# Patient Record
Sex: FEMALE | Race: WHITE | NOT HISPANIC OR LATINO | Employment: OTHER | URBAN - METROPOLITAN AREA
[De-identification: names, ages, dates, MRNs, and addresses within clinical notes are randomized per-mention and may not be internally consistent; named-entity substitution may affect disease eponyms.]

---

## 2017-01-17 ENCOUNTER — OFFICE VISIT (OUTPATIENT)
Dept: CARDIOLOGY | Facility: MEDICAL CENTER | Age: 72
End: 2017-01-17
Payer: MEDICARE

## 2017-01-17 VITALS
HEART RATE: 56 BPM | HEIGHT: 62 IN | WEIGHT: 150 LBS | BODY MASS INDEX: 27.6 KG/M2 | DIASTOLIC BLOOD PRESSURE: 74 MMHG | SYSTOLIC BLOOD PRESSURE: 130 MMHG | OXYGEN SATURATION: 98 %

## 2017-01-17 DIAGNOSIS — I49.1 PAC (PREMATURE ATRIAL CONTRACTION): ICD-10-CM

## 2017-01-17 DIAGNOSIS — I49.3 PVC (PREMATURE VENTRICULAR CONTRACTION): ICD-10-CM

## 2017-01-17 DIAGNOSIS — I47.10 SVT (SUPRAVENTRICULAR TACHYCARDIA): ICD-10-CM

## 2017-01-17 PROCEDURE — 99214 OFFICE O/P EST MOD 30 MIN: CPT | Performed by: INTERNAL MEDICINE

## 2017-01-17 ASSESSMENT — ENCOUNTER SYMPTOMS
LOSS OF CONSCIOUSNESS: 0
SHORTNESS OF BREATH: 0
FALLS: 0
COUGH: 0
PND: 0
ORTHOPNEA: 0
DOUBLE VISION: 0
ABDOMINAL PAIN: 0
BRUISES/BLEEDS EASILY: 0
HALLUCINATIONS: 0
SENSORY CHANGE: 0
NAUSEA: 0
EYE PAIN: 0
DIZZINESS: 0
EYE DISCHARGE: 0
CLAUDICATION: 0
PALPITATIONS: 0
BLURRED VISION: 0
WEIGHT LOSS: 0
FEVER: 0
CHILLS: 0
MYALGIAS: 0
VOMITING: 0
DEPRESSION: 0
HEADACHES: 0
SPEECH CHANGE: 0
BLOOD IN STOOL: 0

## 2017-01-17 NOTE — Clinical Note
[facility]   [address], [address]  [place] [address]  Phone: [phone]  Fax: [phone]              Zo Sanders  1945    Encounter Date: 1/17/2017    Tonia Nunez M.D.          PROGRESS NOTE:  Subjective:   Zo Sanders is a 71 y.o. female who presents today for cardiac care and follow-up of her recent event monitor results. The monitor shows evidence of occasional PAC and PVCs. No malignant arrhythmias. Patient has been feeling better these days. However, she does report having exertional dyspnea especially up inclines.    Past Medical History   Diagnosis Date   • Hypertension    • Arrhythmia      History reviewed. No pertinent past surgical history.  Family History   Problem Relation Age of Onset   • Heart Disease Mother      History   Smoking status   • Former Smoker   • Quit date: 07/22/1971   Smokeless tobacco   • Not on file     No Known Allergies  Outpatient Encounter Prescriptions as of 1/17/2017   Medication Sig Dispense Refill   • diltiazem CD (CARDIZEM CD) 120 MG CAPSULE SR 24 HR Take 1 Cap by mouth every day. 30 Cap 3   • lisinopril (PRINIVIL) 20 MG Tab        No facility-administered encounter medications on file as of 1/17/2017.     Review of Systems   Constitutional: Negative for fever, chills, weight loss and malaise/fatigue.   HENT: Negative for ear discharge, ear pain, hearing loss and nosebleeds.    Eyes: Negative for blurred vision, double vision, pain and discharge.   Respiratory: Negative for cough and shortness of breath.    Cardiovascular: Negative for chest pain, palpitations, orthopnea, claudication, leg swelling and PND.   Gastrointestinal: Negative for nausea, vomiting, abdominal pain, blood in stool and melena.   Genitourinary: Negative for dysuria and hematuria.   Musculoskeletal: Negative for myalgias, joint pain and falls.   Skin: Negative for itching and rash.   Neurological: Negative for dizziness, sensory  "change, speech change, loss of consciousness and headaches.   Endo/Heme/Allergies: Negative for environmental allergies. Does not bruise/bleed easily.   Psychiatric/Behavioral: Negative for depression, suicidal ideas and hallucinations.        Objective:   /74 mmHg  Pulse 56  Ht 1.575 m (5' 2\")  Wt 68.04 kg (150 lb)  BMI 27.43 kg/m2  SpO2 98%    Physical Exam   Constitutional: She is oriented to person, place, and time. No distress.   HENT:   Head: Normocephalic and atraumatic.   Eyes: EOM are normal.   Neck: No JVD present.   Cardiovascular: Normal rate, regular rhythm, normal heart sounds and intact distal pulses.  Exam reveals no gallop and no friction rub.    No murmur heard.  Pulmonary/Chest: No respiratory distress. She has no wheezes. She has no rales. She exhibits no tenderness.   Abdominal: She exhibits no distension. There is no tenderness. There is no rebound and no guarding.   Musculoskeletal: She exhibits no edema or tenderness.   Lymphadenopathy:     She has no cervical adenopathy.   Neurological: She is alert and oriented to person, place, and time.   Skin: Skin is dry.   Psychiatric: She has a normal mood and affect.   Nursing note and vitals reviewed.      Assessment:     1. SVT (supraventricular tachycardia) (CMS-HCC)     2. PAC (premature atrial contraction)     3. PVC (premature ventricular contraction)     4. Dyspnea on exertion         Medical Decision Making:  Today's Assessment / Status / Plan:     At this time patient is clinically stable in terms of her cardiac standpoint.  Cont current medications at current dose.     I will see patient back in clinic with lab tests and studies results in 6 months.    I thank you Dr. Lan for referring patient to our Cardiology Clinic today.        Jani Lan M.D.  [address] [facility]  [address]  [facility] [address]  VIA Facsimile: [phone]                   "

## 2017-01-17 NOTE — PROGRESS NOTES
Subjective:   Zo Sanders is a 71 y.o. female who presents today for cardiac care and follow-up of her recent event monitor results. The monitor shows evidence of occasional PAC and PVCs. No malignant arrhythmias. Patient has been feeling better these days. However, she does report having exertional dyspnea especially up inclines.    Past Medical History   Diagnosis Date   • Hypertension    • Arrhythmia      History reviewed. No pertinent past surgical history.  Family History   Problem Relation Age of Onset   • Heart Disease Mother      History   Smoking status   • Former Smoker   • Quit date: 07/22/1971   Smokeless tobacco   • Not on file     No Known Allergies  Outpatient Encounter Prescriptions as of 1/17/2017   Medication Sig Dispense Refill   • diltiazem CD (CARDIZEM CD) 120 MG CAPSULE SR 24 HR Take 1 Cap by mouth every day. 30 Cap 3   • lisinopril (PRINIVIL) 20 MG Tab        No facility-administered encounter medications on file as of 1/17/2017.     Review of Systems   Constitutional: Negative for fever, chills, weight loss and malaise/fatigue.   HENT: Negative for ear discharge, ear pain, hearing loss and nosebleeds.    Eyes: Negative for blurred vision, double vision, pain and discharge.   Respiratory: Negative for cough and shortness of breath.    Cardiovascular: Negative for chest pain, palpitations, orthopnea, claudication, leg swelling and PND.   Gastrointestinal: Negative for nausea, vomiting, abdominal pain, blood in stool and melena.   Genitourinary: Negative for dysuria and hematuria.   Musculoskeletal: Negative for myalgias, joint pain and falls.   Skin: Negative for itching and rash.   Neurological: Negative for dizziness, sensory change, speech change, loss of consciousness and headaches.   Endo/Heme/Allergies: Negative for environmental allergies. Does not bruise/bleed easily.   Psychiatric/Behavioral: Negative for depression, suicidal ideas and hallucinations.        Objective:   /74  "mmHg  Pulse 56  Ht 1.575 m (5' 2\")  Wt 68.04 kg (150 lb)  BMI 27.43 kg/m2  SpO2 98%    Physical Exam   Constitutional: She is oriented to person, place, and time. No distress.   HENT:   Head: Normocephalic and atraumatic.   Eyes: EOM are normal.   Neck: No JVD present.   Cardiovascular: Normal rate, regular rhythm, normal heart sounds and intact distal pulses.  Exam reveals no gallop and no friction rub.    No murmur heard.  Pulmonary/Chest: No respiratory distress. She has no wheezes. She has no rales. She exhibits no tenderness.   Abdominal: She exhibits no distension. There is no tenderness. There is no rebound and no guarding.   Musculoskeletal: She exhibits no edema or tenderness.   Lymphadenopathy:     She has no cervical adenopathy.   Neurological: She is alert and oriented to person, place, and time.   Skin: Skin is dry.   Psychiatric: She has a normal mood and affect.   Nursing note and vitals reviewed.      Assessment:     1. SVT (supraventricular tachycardia) (CMS-HCC)     2. PAC (premature atrial contraction)     3. PVC (premature ventricular contraction)     4. Dyspnea on exertion         Medical Decision Making:  Today's Assessment / Status / Plan:     At this time patient is clinically stable in terms of her cardiac standpoint.  Cont current medications at current dose.     I will see patient back in clinic with lab tests and studies results in 6 months.    I thank you Dr. Lan for referring patient to our Cardiology Clinic today.    "

## 2017-01-17 NOTE — MR AVS SNAPSHOT
"        Zo Cisneros Jordanpio   2017 9:45 AM   Office Visit   MRN: [id number]    Department:  [facility]   Dept Phone:  [phone]    Description:  Female : 1945   Provider:  Tonia Nunez M.D.           Reason for Visit     Follow-Up           Allergies as of 2017     No Known Allergies      You were diagnosed with     SVT (supraventricular tachycardia) (CMS-[facility])   [652883]       PAC (premature atrial contraction)   [799234]       PVC (premature ventricular contraction)   [981383]         Vital Signs     Blood Pressure Pulse Height Weight Body Mass Index Oxygen Saturation    130/74 mmHg 56 1.575 m (5' 2\") 68.04 kg (150 lb) 27.43 kg/m2 98%    Smoking Status                   Former Smoker           Basic Information     Date Of Birth Sex Race Ethnicity Preferred Language    1945 Female White Non- English      Health Maintenance        Date Due Completion Dates    IMM DTaP/Tdap/Td Vaccine (1 - Tdap) 1963 ---    PAP SMEAR 1965 ---    MAMMOGRAM 1984 ---    COLONOSCOPY 1994 ---    IMM ZOSTER VACCINE 2004 ---    BONE DENSITY 2009 ---    IMM PNEUMOCOCCAL 65+ (ADULT) LOW/MEDIUM RISK SERIES (1 of 2 - PCV13) 2009 ---    IMM INFLUENZA (1) 2016 ---            Current Immunizations     No immunizations on file.      Below and/or attached are the medications your provider expects you to take. Review all of your home medications and newly ordered medications with your provider and/or pharmacist. Follow medication instructions as directed by your provider and/or pharmacist. Please keep your medication list with you and share with your provider. Update the information when medications are discontinued, doses are changed, or new medications (including over-the-counter products) are added; and carry medication information at all times in the event of emergency situations     Allergies:  No Known Allergies          Medications  Valid as of: 2017 - 10:06 AM   " Generic Name Brand Name Tablet Size Instructions for use    DiltiaZEM HCl Coated Beads (CAPSULE SR 24 HR) CARDIZEM  MG Take 1 Cap by mouth every day.        Lisinopril (Tab) PRINIVIL 20 MG         .                 Medicines prescribed today were sent to:     [facility] [address] [place] [address], [place] - [address] [facility] [place] [address]    Phone: [phone] Fax: [phone]    Open 24 Hours?: No      Medication refill instructions:       If your prescription bottle indicates you have medication refills left, it is not necessary to call your provider’s office. Please contact your pharmacy and they will refill your medication.    If your prescription bottle indicates you do not have any refills left, you may request refills at any time through one of the following ways: The online Vigilos system (except Urgent Care), by calling your provider’s office, or by asking your pharmacy to contact your provider’s office with a refill request. Medication refills are processed only during regular business hours and may not be available until the next business day. Your provider may request additional information or to have a follow-up visit with you prior to refilling your medication.   *Please Note: Medication refills are assigned a new Rx number when refilled electronically. Your pharmacy may indicate that no refills were authorized even though a new prescription for the same medication is available at the pharmacy. Please request the medicine by name with the pharmacy before contacting your provider for a refill.        Your To Do List     Future Labs/Procedures Complete By Expires    COMP METABOLIC PANEL  As directed 1/18/2018         Vigilos Access Code: Activation code not generated  Current Vigilos Status: Active

## 2017-02-23 DIAGNOSIS — I49.1 PREMATURE ATRIAL COMPLEX: ICD-10-CM

## 2017-02-24 RX ORDER — DILTIAZEM HYDROCHLORIDE 120 MG/1
CAPSULE, COATED, EXTENDED RELEASE ORAL
Qty: 30 CAP | Refills: 6 | Status: SHIPPED | OUTPATIENT
Start: 2017-02-24 | End: 2017-07-20 | Stop reason: SDUPTHER

## 2017-07-13 LAB
CHOLEST SERPL-MCNC: 172 MG/DL (ref 100–199)
COMMENT 011824: NORMAL
HDLC SERPL-MCNC: 69 MG/DL
LDLC SERPL CALC-MCNC: 83 MG/DL (ref 0–99)
TRIGL SERPL-MCNC: 100 MG/DL (ref 0–149)
VLDLC SERPL CALC-MCNC: 20 MG/DL (ref 5–40)

## 2017-07-20 ENCOUNTER — OFFICE VISIT (OUTPATIENT)
Dept: CARDIOLOGY | Facility: MEDICAL CENTER | Age: 72
End: 2017-07-20
Payer: MEDICARE

## 2017-07-20 VITALS
WEIGHT: 155 LBS | BODY MASS INDEX: 28.52 KG/M2 | HEIGHT: 62 IN | DIASTOLIC BLOOD PRESSURE: 76 MMHG | OXYGEN SATURATION: 95 % | SYSTOLIC BLOOD PRESSURE: 114 MMHG | HEART RATE: 68 BPM

## 2017-07-20 DIAGNOSIS — I49.1 PREMATURE ATRIAL COMPLEX: ICD-10-CM

## 2017-07-20 DIAGNOSIS — I49.3 PREMATURE VENTRICULAR COMPLEX: ICD-10-CM

## 2017-07-20 DIAGNOSIS — I10 ESSENTIAL HYPERTENSION: ICD-10-CM

## 2017-07-20 PROCEDURE — 99214 OFFICE O/P EST MOD 30 MIN: CPT | Performed by: INTERNAL MEDICINE

## 2017-07-20 RX ORDER — DILTIAZEM HYDROCHLORIDE 120 MG/1
120 CAPSULE, COATED, EXTENDED RELEASE ORAL DAILY
Qty: 90 CAP | Refills: 3 | Status: SHIPPED | OUTPATIENT
Start: 2017-07-20 | End: 2018-02-05 | Stop reason: SDUPTHER

## 2017-07-20 RX ORDER — LISINOPRIL 20 MG/1
20 TABLET ORAL DAILY
Qty: 90 TAB | Refills: 3 | Status: SHIPPED | OUTPATIENT
Start: 2017-07-20 | End: 2018-02-05 | Stop reason: SDUPTHER

## 2017-07-20 ASSESSMENT — ENCOUNTER SYMPTOMS
DOUBLE VISION: 0
LOSS OF CONSCIOUSNESS: 0
NAUSEA: 0
MYALGIAS: 0
BLURRED VISION: 0
CLAUDICATION: 0
COUGH: 0
FALLS: 0
PALPITATIONS: 0
EYE PAIN: 0
HEADACHES: 0
HALLUCINATIONS: 0
EYE DISCHARGE: 0
SHORTNESS OF BREATH: 0
BRUISES/BLEEDS EASILY: 0
FEVER: 0
SPEECH CHANGE: 0
BLOOD IN STOOL: 0
DIZZINESS: 0
ORTHOPNEA: 0
VOMITING: 0
DEPRESSION: 0
ABDOMINAL PAIN: 0
SENSORY CHANGE: 0
PND: 0
WEIGHT LOSS: 0
CHILLS: 0

## 2017-07-20 NOTE — MR AVS SNAPSHOT
"Zo Cisneros Jordanpivicki   2017 10:45 AM   Office Visit   MRN: [id number]    Department:  [facility]   Dept Phone:  [phone]    Description:  Female : 1945   Provider:  Tonia Nunez M.D.           Reason for Visit     Follow-Up           Allergies as of 2017     No Known Allergies      You were diagnosed with     Premature atrial complex   [749887]       Premature ventricular complex   [374028]       Essential hypertension   [1610001]         Vital Signs     Blood Pressure Pulse Height Weight Body Mass Index Oxygen Saturation    114/76 mmHg 68 1.575 m (5' 2\") 70.308 kg (155 lb) 28.34 kg/m2 95%    Smoking Status                   Former Smoker           Basic Information     Date Of Birth Sex Race Ethnicity Preferred Language    1945 Female White Non- English      Health Maintenance        Date Due Completion Dates    IMM DTaP/Tdap/Td Vaccine (1 - Tdap) 1963 ---    PAP SMEAR 1965 ---    MAMMOGRAM 1984 ---    COLONOSCOPY 1994 ---    IMM ZOSTER VACCINE 2004 ---    BONE DENSITY 2009 ---    IMM PNEUMOCOCCAL 65+ (ADULT) LOW/MEDIUM RISK SERIES (1 of 2 - PCV13) 2009 ---    IMM INFLUENZA (1) 2017 ---            Current Immunizations     No immunizations on file.      Below and/or attached are the medications your provider expects you to take. Review all of your home medications and newly ordered medications with your provider and/or pharmacist. Follow medication instructions as directed by your provider and/or pharmacist. Please keep your medication list with you and share with your provider. Update the information when medications are discontinued, doses are changed, or new medications (including over-the-counter products) are added; and carry medication information at all times in the event of emergency situations     Allergies:  No Known Allergies          Medications  Valid as of: 2017 - 11:08 AM    Generic Name Brand Name Tablet Size " Instructions for use    DilTIAZem HCl Coated Beads (CAPSULE SR 24 HR) CARDIZEM  MG Take 1 Cap by mouth every day.        Lisinopril (Tab) PRINIVIL 20 MG Take 1 Tab by mouth every day.        .                 Medicines prescribed today were sent to:     [facility] [address] [place] [address], [place] - [address] [id number] [facility] [place] [address]    Phone: [phone] Fax: [phone]    Open 24 Hours?: No      Medication refill instructions:       If your prescription bottle indicates you have medication refills left, it is not necessary to call your provider’s office. Please contact your pharmacy and they will refill your medication.    If your prescription bottle indicates you do not have any refills left, you may request refills at any time through one of the following ways: The online Moka system (except Urgent Care), by calling your provider’s office, or by asking your pharmacy to contact your provider’s office with a refill request. Medication refills are processed only during regular business hours and may not be available until the next business day. Your provider may request additional information or to have a follow-up visit with you prior to refilling your medication.   *Please Note: Medication refills are assigned a new Rx number when refilled electronically. Your pharmacy may indicate that no refills were authorized even though a new prescription for the same medication is available at the pharmacy. Please request the medicine by name with the pharmacy before contacting your provider for a refill.        Your To Do List     Future Labs/Procedures Complete By Expires    COMP METABOLIC PANEL  As directed 7/21/2018         Moka Access Code: Activation code not generated  Current Moka Status: Active

## 2017-07-20 NOTE — Clinical Note
[facility]   [address], [address]  [place] [address]  Phone: [phone]  Fax: [phone]              Zo Sanders  1945    Encounter Date: 7/20/2017    Tonia Nunez M.D.          PROGRESS NOTE:  Subjective:   Zo Sanders is a 71 y.o. female who presents today for cardiac care and follow-up of her recent event monitor results. The monitor shows evidence of occasional PAC and PVCs. No malignant arrhythmias. Patient has been feeling better these days. However, she does report having exertional dyspnea especially up inclines.    LDL is within goal.   Renal function and Liver function are adequate.    Past Medical History   Diagnosis Date   • Hypertension    • Arrhythmia      History reviewed. No pertinent past surgical history.  Family History   Problem Relation Age of Onset   • Heart Disease Mother      History   Smoking status   • Former Smoker   • Quit date: 07/22/1971   Smokeless tobacco   • Not on file     No Known Allergies  Outpatient Encounter Prescriptions as of 7/20/2017   Medication Sig Dispense Refill   • diltiazem CD (CARDIZEM CD) 120 MG CAPSULE SR 24 HR TAKE ONE CAPSULE BY MOUTH ONCE DAILY 30 Cap 6   • lisinopril (PRINIVIL) 20 MG Tab        No facility-administered encounter medications on file as of 7/20/2017.     Review of Systems   Constitutional: Negative for fever, chills, weight loss and malaise/fatigue.   HENT: Negative for ear discharge, ear pain, hearing loss and nosebleeds.    Eyes: Negative for blurred vision, double vision, pain and discharge.   Respiratory: Negative for cough and shortness of breath.    Cardiovascular: Negative for chest pain, palpitations, orthopnea, claudication, leg swelling and PND.   Gastrointestinal: Negative for nausea, vomiting, abdominal pain, blood in stool and melena.   Genitourinary: Negative for dysuria and hematuria.   Musculoskeletal: Negative for myalgias, joint pain and falls.   Skin:  "Negative for itching and rash.   Neurological: Negative for dizziness, sensory change, speech change, loss of consciousness and headaches.   Endo/Heme/Allergies: Negative for environmental allergies. Does not bruise/bleed easily.   Psychiatric/Behavioral: Negative for depression, suicidal ideas and hallucinations.        Objective:   /76 mmHg  Pulse 68  Ht 1.575 m (5' 2\")  Wt 70.308 kg (155 lb)  BMI 28.34 kg/m2  SpO2 95%    Physical Exam   Constitutional: She is oriented to person, place, and time. She appears well-developed and well-nourished.   HENT:   Head: Normocephalic and atraumatic.   Eyes: EOM are normal.   Neck: No JVD present.   Cardiovascular: Normal rate, regular rhythm, normal heart sounds and intact distal pulses.  Exam reveals no gallop and no friction rub.    No murmur heard.  Pulmonary/Chest: No respiratory distress. She has no wheezes. She has no rales. She exhibits no tenderness.   Abdominal: She exhibits no distension. There is no tenderness. There is no rebound and no guarding.   Musculoskeletal: She exhibits no edema or tenderness.   Lymphadenopathy:     She has no cervical adenopathy.   Neurological: She is alert and oriented to person, place, and time.   Skin: Skin is dry.   Psychiatric: She has a normal mood and affect.   Nursing note and vitals reviewed.      Assessment:     1. Premature atrial complex  COMP METABOLIC PANEL    LIPID PANEL   2. Premature ventricular complex  COMP METABOLIC PANEL    LIPID PANEL   3. Essential hypertension  COMP METABOLIC PANEL    LIPID PANEL       Medical Decision Making:  Today's Assessment / Status / Plan:     Blood pressure is well controlled.  At this time patient is clinically stable in terms of her cardiac standpoint.    Cont current medications at current dose.     I will see patient back in clinic with lab tests and studies results in 12 months.    I thank you Dr. Lan for referring patient to our Cardiology Clinic today.        Jani" HEMA Lan M.D.  [address] [facility] [address]  [facility] [address]  VIA Facsimile: [phone]

## 2017-07-20 NOTE — PROGRESS NOTES
Subjective:   Zo Sanders is a 71 y.o. female who presents today for cardiac care and follow-up of her recent event monitor results. The monitor shows evidence of occasional PAC and PVCs. No malignant arrhythmias. Patient has been feeling better these days. However, she does report having exertional dyspnea especially up inclines.    LDL is within goal.   Renal function and Liver function are adequate.    Past Medical History   Diagnosis Date   • Hypertension    • Arrhythmia      History reviewed. No pertinent past surgical history.  Family History   Problem Relation Age of Onset   • Heart Disease Mother      History   Smoking status   • Former Smoker   • Quit date: 07/22/1971   Smokeless tobacco   • Not on file     No Known Allergies  Outpatient Encounter Prescriptions as of 7/20/2017   Medication Sig Dispense Refill   • diltiazem CD (CARDIZEM CD) 120 MG CAPSULE SR 24 HR TAKE ONE CAPSULE BY MOUTH ONCE DAILY 30 Cap 6   • lisinopril (PRINIVIL) 20 MG Tab        No facility-administered encounter medications on file as of 7/20/2017.     Review of Systems   Constitutional: Negative for fever, chills, weight loss and malaise/fatigue.   HENT: Negative for ear discharge, ear pain, hearing loss and nosebleeds.    Eyes: Negative for blurred vision, double vision, pain and discharge.   Respiratory: Negative for cough and shortness of breath.    Cardiovascular: Negative for chest pain, palpitations, orthopnea, claudication, leg swelling and PND.   Gastrointestinal: Negative for nausea, vomiting, abdominal pain, blood in stool and melena.   Genitourinary: Negative for dysuria and hematuria.   Musculoskeletal: Negative for myalgias, joint pain and falls.   Skin: Negative for itching and rash.   Neurological: Negative for dizziness, sensory change, speech change, loss of consciousness and headaches.   Endo/Heme/Allergies: Negative for environmental allergies. Does not bruise/bleed easily.   Psychiatric/Behavioral: Negative for  "depression, suicidal ideas and hallucinations.        Objective:   /76 mmHg  Pulse 68  Ht 1.575 m (5' 2\")  Wt 70.308 kg (155 lb)  BMI 28.34 kg/m2  SpO2 95%    Physical Exam   Constitutional: She is oriented to person, place, and time. She appears well-developed and well-nourished.   HENT:   Head: Normocephalic and atraumatic.   Eyes: EOM are normal.   Neck: No JVD present.   Cardiovascular: Normal rate, regular rhythm, normal heart sounds and intact distal pulses.  Exam reveals no gallop and no friction rub.    No murmur heard.  Pulmonary/Chest: No respiratory distress. She has no wheezes. She has no rales. She exhibits no tenderness.   Abdominal: She exhibits no distension. There is no tenderness. There is no rebound and no guarding.   Musculoskeletal: She exhibits no edema or tenderness.   Lymphadenopathy:     She has no cervical adenopathy.   Neurological: She is alert and oriented to person, place, and time.   Skin: Skin is dry.   Psychiatric: She has a normal mood and affect.   Nursing note and vitals reviewed.      Assessment:     1. Premature atrial complex  COMP METABOLIC PANEL    LIPID PANEL   2. Premature ventricular complex  COMP METABOLIC PANEL    LIPID PANEL   3. Essential hypertension  COMP METABOLIC PANEL    LIPID PANEL       Medical Decision Making:  Today's Assessment / Status / Plan:     Blood pressure is well controlled.  At this time patient is clinically stable in terms of her cardiac standpoint.    Cont current medications at current dose.     I will see patient back in clinic with lab tests and studies results in 12 months.    I thank you Dr. Lan for referring patient to our Cardiology Clinic today.    "

## 2018-01-29 ENCOUNTER — TELEPHONE (OUTPATIENT)
Dept: CARDIOLOGY | Facility: MEDICAL CENTER | Age: 73
End: 2018-01-29

## 2018-01-29 NOTE — TELEPHONE ENCOUNTER
Spoke with pt. In regards to her labs. Mailed copy of labs to pt. She will go have done prior to appt.

## 2018-02-05 ENCOUNTER — OFFICE VISIT (OUTPATIENT)
Dept: CARDIOLOGY | Facility: MEDICAL CENTER | Age: 73
End: 2018-02-05
Payer: MEDICARE

## 2018-02-05 VITALS
SYSTOLIC BLOOD PRESSURE: 118 MMHG | HEART RATE: 78 BPM | HEIGHT: 62 IN | BODY MASS INDEX: 27.6 KG/M2 | WEIGHT: 150 LBS | DIASTOLIC BLOOD PRESSURE: 68 MMHG | OXYGEN SATURATION: 94 %

## 2018-02-05 DIAGNOSIS — I49.1 PREMATURE ATRIAL COMPLEX: ICD-10-CM

## 2018-02-05 DIAGNOSIS — I10 ESSENTIAL HYPERTENSION: ICD-10-CM

## 2018-02-05 DIAGNOSIS — I49.3 PREMATURE VENTRICULAR COMPLEX: ICD-10-CM

## 2018-02-05 PROCEDURE — 99214 OFFICE O/P EST MOD 30 MIN: CPT | Performed by: INTERNAL MEDICINE

## 2018-02-05 RX ORDER — DILTIAZEM HYDROCHLORIDE 120 MG/1
120 CAPSULE, COATED, EXTENDED RELEASE ORAL DAILY
Qty: 90 CAP | Refills: 3 | Status: SHIPPED | OUTPATIENT
Start: 2018-02-05 | End: 2018-08-13 | Stop reason: SDUPTHER

## 2018-02-05 RX ORDER — LISINOPRIL 20 MG/1
20 TABLET ORAL DAILY
Qty: 90 TAB | Refills: 3 | Status: SHIPPED | OUTPATIENT
Start: 2018-02-05 | End: 2018-08-13 | Stop reason: SDUPTHER

## 2018-02-05 ASSESSMENT — ENCOUNTER SYMPTOMS
LOSS OF CONSCIOUSNESS: 0
WEIGHT LOSS: 0
FALLS: 0
NAUSEA: 0
BLOOD IN STOOL: 0
CHILLS: 0
PND: 0
SENSORY CHANGE: 0
HEADACHES: 0
SHORTNESS OF BREATH: 0
SPEECH CHANGE: 0
VOMITING: 0
DOUBLE VISION: 0
DEPRESSION: 0
BLURRED VISION: 0
DIZZINESS: 0
MYALGIAS: 0
CLAUDICATION: 0
FEVER: 0
EYE DISCHARGE: 0
COUGH: 0
HALLUCINATIONS: 0
PALPITATIONS: 0
ORTHOPNEA: 0
BRUISES/BLEEDS EASILY: 0
EYE PAIN: 0
ABDOMINAL PAIN: 0

## 2018-02-05 NOTE — LETTER
[facility]   [address], [address]  [place] [address]  Phone: [phone]  Fax: [phone]              Zo Sanders  1945    Encounter Date: 2/5/2018    Tonia Nunez M.D.          PROGRESS NOTE:  Subjective:   Zo Sanders is a 72 y.o. female who presents today for cardiac care for occasional PAC and PVCs. No malignant arrhythmias. Patient has been feeling better these days. However, she does report having exertional dyspnea especially up inclines.     LDL is within goal.   Renal function and Liver function are adequate.    Chief Complaint: palpitations.    Past Medical History:   Diagnosis Date   • Arrhythmia    • Hypertension      History reviewed. No pertinent surgical history.  Family History   Problem Relation Age of Onset   • Heart Disease Mother      History   Smoking Status   • Former Smoker   • Quit date: 7/22/1971   Smokeless Tobacco   • Never Used     No Known Allergies  Outpatient Encounter Prescriptions as of 2/5/2018   Medication Sig Dispense Refill   • DILTIAZem CD (CARDIZEM CD) 120 MG CAPSULE SR 24 HR Take 1 Cap by mouth every day. 90 Cap 3   • lisinopril (PRINIVIL) 20 MG Tab Take 1 Tab by mouth every day. 90 Tab 3   • [DISCONTINUED] lisinopril (PRINIVIL) 20 MG Tab Take 1 Tab by mouth every day. 90 Tab 3   • [DISCONTINUED] diltiazem CD (CARDIZEM CD) 120 MG CAPSULE SR 24 HR Take 1 Cap by mouth every day. 90 Cap 3     No facility-administered encounter medications on file as of 2/5/2018.      Review of Systems   Constitutional: Negative for chills, fever, malaise/fatigue and weight loss.   HENT: Negative for ear discharge, ear pain, hearing loss and nosebleeds.    Eyes: Negative for blurred vision, double vision, pain and discharge.   Respiratory: Negative for cough and shortness of breath.    Cardiovascular: Negative for chest pain, palpitations, orthopnea, claudication, leg swelling and PND.   Gastrointestinal: Negative for abdominal  "pain, blood in stool, melena, nausea and vomiting.   Genitourinary: Negative for dysuria and hematuria.   Musculoskeletal: Negative for falls, joint pain and myalgias.   Skin: Negative for itching and rash.   Neurological: Negative for dizziness, sensory change, speech change, loss of consciousness and headaches.   Endo/Heme/Allergies: Negative for environmental allergies. Does not bruise/bleed easily.   Psychiatric/Behavioral: Negative for depression, hallucinations and suicidal ideas.        Objective:   /68   Pulse 78   Ht 1.575 m (5' 2\")   Wt 68 kg (150 lb)   SpO2 94%   BMI 27.44 kg/m²      Physical Exam   Constitutional: She is oriented to person, place, and time. She appears well-developed and well-nourished.   HENT:   Head: Normocephalic and atraumatic.   Eyes: EOM are normal.   Neck: No JVD present.   Cardiovascular: Normal rate, regular rhythm, normal heart sounds and intact distal pulses.  Exam reveals no gallop and no friction rub.    No murmur heard.  Pulmonary/Chest: No respiratory distress. She has no wheezes. She has no rales. She exhibits no tenderness.   Abdominal: She exhibits no distension. There is no tenderness. There is no rebound and no guarding.   Musculoskeletal: She exhibits no edema or tenderness.   Lymphadenopathy:     She has no cervical adenopathy.   Neurological: She is alert and oriented to person, place, and time.   Skin: Skin is dry.   Psychiatric: She has a normal mood and affect.   Nursing note and vitals reviewed.      Assessment:     1. Premature atrial complex  DILTIAZem CD (CARDIZEM CD) 120 MG CAPSULE SR 24 HR    lisinopril (PRINIVIL) 20 MG Tab   2. Premature ventricular complex  DILTIAZem CD (CARDIZEM CD) 120 MG CAPSULE SR 24 HR    lisinopril (PRINIVIL) 20 MG Tab   3. Essential hypertension  DILTIAZem CD (CARDIZEM CD) 120 MG CAPSULE SR 24 HR    lisinopril (PRINIVIL) 20 MG Tab       Medical Decision Making:  Today's Assessment / Status / Plan:   At this time patient " is clinically stable in terms of her cardiac standpoint.  No symptoms.  Cont current medications at current dose.     I will see patient back in clinic with lab tests and studies results in 6 months.    I thank you Dr. Lan for referring patient to our Cardiology Clinic today.        Jani Lan M.D.  [address] [facility] [address]  [facility] [address]  VIA Facsimile: [phone]

## 2018-02-05 NOTE — PROGRESS NOTES
Subjective:   Zo Sanders is a 72 y.o. female who presents today for cardiac care for occasional PAC and PVCs. No malignant arrhythmias. Patient has been feeling better these days. However, she does report having exertional dyspnea especially up inclines.     LDL is within goal.   Renal function and Liver function are adequate.    Chief Complaint: palpitations.    Past Medical History:   Diagnosis Date   • Arrhythmia    • Hypertension      History reviewed. No pertinent surgical history.  Family History   Problem Relation Age of Onset   • Heart Disease Mother      History   Smoking Status   • Former Smoker   • Quit date: 7/22/1971   Smokeless Tobacco   • Never Used     No Known Allergies  Outpatient Encounter Prescriptions as of 2/5/2018   Medication Sig Dispense Refill   • DILTIAZem CD (CARDIZEM CD) 120 MG CAPSULE SR 24 HR Take 1 Cap by mouth every day. 90 Cap 3   • lisinopril (PRINIVIL) 20 MG Tab Take 1 Tab by mouth every day. 90 Tab 3   • [DISCONTINUED] lisinopril (PRINIVIL) 20 MG Tab Take 1 Tab by mouth every day. 90 Tab 3   • [DISCONTINUED] diltiazem CD (CARDIZEM CD) 120 MG CAPSULE SR 24 HR Take 1 Cap by mouth every day. 90 Cap 3     No facility-administered encounter medications on file as of 2/5/2018.      Review of Systems   Constitutional: Negative for chills, fever, malaise/fatigue and weight loss.   HENT: Negative for ear discharge, ear pain, hearing loss and nosebleeds.    Eyes: Negative for blurred vision, double vision, pain and discharge.   Respiratory: Negative for cough and shortness of breath.    Cardiovascular: Negative for chest pain, palpitations, orthopnea, claudication, leg swelling and PND.   Gastrointestinal: Negative for abdominal pain, blood in stool, melena, nausea and vomiting.   Genitourinary: Negative for dysuria and hematuria.   Musculoskeletal: Negative for falls, joint pain and myalgias.   Skin: Negative for itching and rash.   Neurological: Negative for dizziness, sensory  "change, speech change, loss of consciousness and headaches.   Endo/Heme/Allergies: Negative for environmental allergies. Does not bruise/bleed easily.   Psychiatric/Behavioral: Negative for depression, hallucinations and suicidal ideas.        Objective:   /68   Pulse 78   Ht 1.575 m (5' 2\")   Wt 68 kg (150 lb)   SpO2 94%   BMI 27.44 kg/m²     Physical Exam   Constitutional: She is oriented to person, place, and time. She appears well-developed and well-nourished.   HENT:   Head: Normocephalic and atraumatic.   Eyes: EOM are normal.   Neck: No JVD present.   Cardiovascular: Normal rate, regular rhythm, normal heart sounds and intact distal pulses.  Exam reveals no gallop and no friction rub.    No murmur heard.  Pulmonary/Chest: No respiratory distress. She has no wheezes. She has no rales. She exhibits no tenderness.   Abdominal: She exhibits no distension. There is no tenderness. There is no rebound and no guarding.   Musculoskeletal: She exhibits no edema or tenderness.   Lymphadenopathy:     She has no cervical adenopathy.   Neurological: She is alert and oriented to person, place, and time.   Skin: Skin is dry.   Psychiatric: She has a normal mood and affect.   Nursing note and vitals reviewed.      Assessment:     1. Premature atrial complex  DILTIAZem CD (CARDIZEM CD) 120 MG CAPSULE SR 24 HR    lisinopril (PRINIVIL) 20 MG Tab   2. Premature ventricular complex  DILTIAZem CD (CARDIZEM CD) 120 MG CAPSULE SR 24 HR    lisinopril (PRINIVIL) 20 MG Tab   3. Essential hypertension  DILTIAZem CD (CARDIZEM CD) 120 MG CAPSULE SR 24 HR    lisinopril (PRINIVIL) 20 MG Tab       Medical Decision Making:  Today's Assessment / Status / Plan:   At this time patient is clinically stable in terms of her cardiac standpoint.  No symptoms.  Cont current medications at current dose.     I will see patient back in clinic with lab tests and studies results in 6 months.    I thank you Dr. Lan for referring patient to our " Cardiology Clinic today.

## 2018-02-09 LAB
ALBUMIN SERPL-MCNC: 4.1 G/DL (ref 3.5–4.8)
ALBUMIN/GLOB SERPL: 1.4 {RATIO} (ref 1.2–2.2)
ALP SERPL-CCNC: 71 IU/L (ref 39–117)
ALT SERPL-CCNC: 9 IU/L (ref 0–32)
AST SERPL-CCNC: 14 IU/L (ref 0–40)
BILIRUB SERPL-MCNC: 0.4 MG/DL (ref 0–1.2)
BUN SERPL-MCNC: 16 MG/DL (ref 8–27)
BUN/CREAT SERPL: 14 (ref 12–28)
CALCIUM SERPL-MCNC: 9.6 MG/DL (ref 8.7–10.3)
CHLORIDE SERPL-SCNC: 101 MMOL/L (ref 96–106)
CHOLEST SERPL-MCNC: 163 MG/DL (ref 100–199)
CO2 SERPL-SCNC: 22 MMOL/L (ref 18–29)
CREAT SERPL-MCNC: 1.15 MG/DL (ref 0.57–1)
GFR SERPLBLD CREATININE-BSD FMLA CKD-EPI: 47 ML/MIN/1.73
GFR SERPLBLD CREATININE-BSD FMLA CKD-EPI: 55 ML/MIN/1.73
GLOBULIN SER CALC-MCNC: 3 G/DL (ref 1.5–4.5)
GLUCOSE SERPL-MCNC: 88 MG/DL (ref 65–99)
HDLC SERPL-MCNC: 54 MG/DL
LABORATORY COMMENT REPORT: NORMAL
LDLC SERPL CALC-MCNC: 92 MG/DL (ref 0–99)
POTASSIUM SERPL-SCNC: 5.4 MMOL/L (ref 3.5–5.2)
PROT SERPL-MCNC: 7.1 G/DL (ref 6–8.5)
SODIUM SERPL-SCNC: 142 MMOL/L (ref 134–144)
TRIGL SERPL-MCNC: 84 MG/DL (ref 0–149)
VLDLC SERPL CALC-MCNC: 17 MG/DL (ref 5–40)

## 2018-02-26 DIAGNOSIS — E87.5 HYPERKALEMIA: ICD-10-CM

## 2018-04-04 ENCOUNTER — TELEPHONE (OUTPATIENT)
Dept: CARDIOLOGY | Facility: MEDICAL CENTER | Age: 73
End: 2018-04-04

## 2018-04-04 NOTE — TELEPHONE ENCOUNTER
REFERRAL TO NEPHROLOGY   Jadyn Morales   Sent: Tue February 27, 2018  8:06 AM   To: Morenita Zarate R.N.                  Message     The referral to Nephrology will be sent to [facility]/ Nephrology. The contact number is [phone].      Noted.

## 2018-08-13 ENCOUNTER — OFFICE VISIT (OUTPATIENT)
Dept: CARDIOLOGY | Facility: MEDICAL CENTER | Age: 73
End: 2018-08-13
Payer: MEDICARE

## 2018-08-13 VITALS
WEIGHT: 146 LBS | HEIGHT: 62 IN | DIASTOLIC BLOOD PRESSURE: 80 MMHG | BODY MASS INDEX: 26.87 KG/M2 | HEART RATE: 76 BPM | SYSTOLIC BLOOD PRESSURE: 124 MMHG | OXYGEN SATURATION: 92 %

## 2018-08-13 DIAGNOSIS — I49.3 PVC (PREMATURE VENTRICULAR CONTRACTION): ICD-10-CM

## 2018-08-13 DIAGNOSIS — I10 ESSENTIAL HYPERTENSION: ICD-10-CM

## 2018-08-13 DIAGNOSIS — I49.1 PAC (PREMATURE ATRIAL CONTRACTION): ICD-10-CM

## 2018-08-13 DIAGNOSIS — I47.10 SVT (SUPRAVENTRICULAR TACHYCARDIA): ICD-10-CM

## 2018-08-13 PROCEDURE — 99214 OFFICE O/P EST MOD 30 MIN: CPT | Performed by: INTERNAL MEDICINE

## 2018-08-13 RX ORDER — LISINOPRIL 20 MG/1
20 TABLET ORAL DAILY
Qty: 90 TAB | Refills: 3 | Status: SHIPPED | OUTPATIENT
Start: 2018-08-13 | End: 2019-08-12 | Stop reason: SDUPTHER

## 2018-08-13 RX ORDER — DILTIAZEM HYDROCHLORIDE 120 MG/1
120 CAPSULE, COATED, EXTENDED RELEASE ORAL DAILY
Qty: 90 CAP | Refills: 3 | Status: SHIPPED | OUTPATIENT
Start: 2018-08-13 | End: 2019-08-12 | Stop reason: SDUPTHER

## 2018-08-13 ASSESSMENT — ENCOUNTER SYMPTOMS
CLAUDICATION: 0
BLOOD IN STOOL: 0
FALLS: 0
DOUBLE VISION: 0
PALPITATIONS: 0
SENSORY CHANGE: 0
ORTHOPNEA: 0
HEADACHES: 0
DIZZINESS: 0
WEIGHT LOSS: 0
COUGH: 0
MYALGIAS: 0
SHORTNESS OF BREATH: 0
PND: 0
SPEECH CHANGE: 0
CHILLS: 0
NAUSEA: 0
DEPRESSION: 0
EYE DISCHARGE: 0
HALLUCINATIONS: 0
BRUISES/BLEEDS EASILY: 0
ABDOMINAL PAIN: 0
BLURRED VISION: 0
FEVER: 0
EYE PAIN: 0
VOMITING: 0
LOSS OF CONSCIOUSNESS: 0

## 2018-08-13 NOTE — PROGRESS NOTES
Chief Complaint   Patient presents with   • Supraventricular Tachycardia (SVT)     F/V: 6 MO       Subjective:   Zo Sanders is a 73 y.o. female who presents today for cardiac care for occasional PAC and PVCs. No malignant arrhythmias. Patient has been feeling better these days. However, she does report having exertional dyspnea especially up inclines.     I have independently reviewed blood tests results with patient in clinic which shows normal LDL level, triglycerides, renal and liver function.    Past Medical History:   Diagnosis Date   • Arrhythmia    • Hypertension      History reviewed. No pertinent surgical history.  Family History   Problem Relation Age of Onset   • Heart Disease Mother      Social History     Social History   • Marital status:      Spouse name: N/A   • Number of children: N/A   • Years of education: N/A     Occupational History   • Not on file.     Social History Main Topics   • Smoking status: Former Smoker     Quit date: 7/22/1971   • Smokeless tobacco: Never Used   • Alcohol use No   • Drug use: No   • Sexual activity: Not on file     Other Topics Concern   • Not on file     Social History Narrative   • No narrative on file     No Known Allergies  Outpatient Encounter Prescriptions as of 8/13/2018   Medication Sig Dispense Refill   • DILTIAZem CD (CARDIZEM CD) 120 MG CAPSULE SR 24 HR Take 1 Cap by mouth every day. 90 Cap 3   • lisinopril (PRINIVIL) 20 MG Tab Take 1 Tab by mouth every day. 90 Tab 3   • [DISCONTINUED] DILTIAZem CD (CARDIZEM CD) 120 MG CAPSULE SR 24 HR Take 1 Cap by mouth every day. 90 Cap 3   • [DISCONTINUED] lisinopril (PRINIVIL) 20 MG Tab Take 1 Tab by mouth every day. 90 Tab 3     No facility-administered encounter medications on file as of 8/13/2018.      Review of Systems   Constitutional: Negative for chills, fever, malaise/fatigue and weight loss.   HENT: Negative for ear discharge, ear pain, hearing loss and nosebleeds.    Eyes: Negative for blurred  "vision, double vision, pain and discharge.   Respiratory: Negative for cough and shortness of breath.    Cardiovascular: Negative for chest pain, palpitations, orthopnea, claudication, leg swelling and PND.   Gastrointestinal: Negative for abdominal pain, blood in stool, melena, nausea and vomiting.   Genitourinary: Negative for dysuria and hematuria.   Musculoskeletal: Negative for falls, joint pain and myalgias.   Skin: Negative for itching and rash.   Neurological: Negative for dizziness, sensory change, speech change, loss of consciousness and headaches.   Endo/Heme/Allergies: Negative for environmental allergies. Does not bruise/bleed easily.   Psychiatric/Behavioral: Negative for depression, hallucinations and suicidal ideas.        Objective:   /80   Pulse 76   Ht 1.575 m (5' 2\")   Wt 66.2 kg (146 lb)   SpO2 92%   BMI 26.70 kg/m²     Physical Exam   Constitutional: She is oriented to person, place, and time. No distress.   HENT:   Head: Normocephalic and atraumatic.   Right Ear: External ear normal.   Left Ear: External ear normal.   Eyes: Right eye exhibits no discharge. Left eye exhibits no discharge.   Neck: No JVD present. No thyromegaly present.   Cardiovascular: Normal rate, regular rhythm, normal heart sounds and intact distal pulses.  Exam reveals no gallop and no friction rub.    No murmur heard.  Pulmonary/Chest: Breath sounds normal. No respiratory distress.   Abdominal: Bowel sounds are normal. She exhibits no distension. There is no tenderness.   Musculoskeletal: She exhibits no edema or tenderness.   Neurological: She is alert and oriented to person, place, and time. No cranial nerve deficit.   Skin: Skin is warm and dry. She is not diaphoretic.   Psychiatric: She has a normal mood and affect. Her behavior is normal.   Nursing note and vitals reviewed.      Assessment:     1. PVC (premature ventricular contraction)  COMP METABOLIC PANEL    LIPID PANEL   2. PAC (premature atrial " contraction)  COMP METABOLIC PANEL    LIPID PANEL   3. SVT (supraventricular tachycardia) (HCC)  COMP METABOLIC PANEL    LIPID PANEL   4. Essential hypertension  DILTIAZem CD (CARDIZEM CD) 120 MG CAPSULE SR 24 HR    lisinopril (PRINIVIL) 20 MG Tab       Medical Decision Making:  Today's Assessment / Status / Plan:   At this time patient is clinically stable in terms of her cardiac standpoint.  Cont current medications at current dose.     I will see patient back in clinic with lab tests and studies results in 6 months.    I thank you Dr. Lan for referring patient to our Cardiology Clinic today.

## 2018-08-13 NOTE — LETTER
[facility]   [address]  [place] [address]  Phone: [phone]  Fax: [phone]              Zo Sanders  1945    Encounter Date: 8/13/2018    Tonia Nunez M.D.          PROGRESS NOTE:  Chief Complaint   Patient presents with   • Supraventricular Tachycardia (SVT)     F/V: 6 MO       Subjective:   Zo Sanders is a 73 y.o. female who presents today for cardiac care for occasional PAC and PVCs. No malignant arrhythmias. Patient has been feeling better these days. However, she does report having exertional dyspnea especially up inclines.     I have independently reviewed blood tests results with patient in clinic which shows normal LDL level, triglycerides, renal and liver function.    Past Medical History:   Diagnosis Date   • Arrhythmia    • Hypertension      History reviewed. No pertinent surgical history.  Family History   Problem Relation Age of Onset   • Heart Disease Mother      Social History     Social History   • Marital status:      Spouse name: N/A   • Number of children: N/A   • Years of education: N/A     Occupational History   • Not on file.     Social History Main Topics   • Smoking status: Former Smoker     Quit date: 7/22/1971   • Smokeless tobacco: Never Used   • Alcohol use No   • Drug use: No   • Sexual activity: Not on file     Other Topics Concern   • Not on file     Social History Narrative   • No narrative on file     No Known Allergies  Outpatient Encounter Prescriptions as of 8/13/2018   Medication Sig Dispense Refill   • DILTIAZem CD (CARDIZEM CD) 120 MG CAPSULE SR 24 HR Take 1 Cap by mouth every day. 90 Cap 3   • lisinopril (PRINIVIL) 20 MG Tab Take 1 Tab by mouth every day. 90 Tab 3   • [DISCONTINUED] DILTIAZem CD (CARDIZEM CD) 120 MG CAPSULE SR 24 HR Take 1 Cap by mouth every day. 90 Cap 3   • [DISCONTINUED] lisinopril (PRINIVIL) 20 MG Tab Take 1 Tab by mouth every day. 90 Tab 3     No  "facility-administered encounter medications on file as of 8/13/2018.      Review of Systems   Constitutional: Negative for chills, fever, malaise/fatigue and weight loss.   HENT: Negative for ear discharge, ear pain, hearing loss and nosebleeds.    Eyes: Negative for blurred vision, double vision, pain and discharge.   Respiratory: Negative for cough and shortness of breath.    Cardiovascular: Negative for chest pain, palpitations, orthopnea, claudication, leg swelling and PND.   Gastrointestinal: Negative for abdominal pain, blood in stool, melena, nausea and vomiting.   Genitourinary: Negative for dysuria and hematuria.   Musculoskeletal: Negative for falls, joint pain and myalgias.   Skin: Negative for itching and rash.   Neurological: Negative for dizziness, sensory change, speech change, loss of consciousness and headaches.   Endo/Heme/Allergies: Negative for environmental allergies. Does not bruise/bleed easily.   Psychiatric/Behavioral: Negative for depression, hallucinations and suicidal ideas.        Objective:   /80   Pulse 76   Ht 1.575 m (5' 2\")   Wt 66.2 kg (146 lb)   SpO2 92%   BMI 26.70 kg/m²      Physical Exam   Constitutional: She is oriented to person, place, and time. No distress.   HENT:   Head: Normocephalic and atraumatic.   Right Ear: External ear normal.   Left Ear: External ear normal.   Eyes: Right eye exhibits no discharge. Left eye exhibits no discharge.   Neck: No JVD present. No thyromegaly present.   Cardiovascular: Normal rate, regular rhythm, normal heart sounds and intact distal pulses.  Exam reveals no gallop and no friction rub.    No murmur heard.  Pulmonary/Chest: Breath sounds normal. No respiratory distress.   Abdominal: Bowel sounds are normal. She exhibits no distension. There is no tenderness.   Musculoskeletal: She exhibits no edema or tenderness.   Neurological: She is alert and oriented to person, place, and time. No cranial nerve deficit.   Skin: Skin is warm " and dry. She is not diaphoretic.   Psychiatric: She has a normal mood and affect. Her behavior is normal.   Nursing note and vitals reviewed.      Assessment:     1. PVC (premature ventricular contraction)  COMP METABOLIC PANEL    LIPID PANEL   2. PAC (premature atrial contraction)  COMP METABOLIC PANEL    LIPID PANEL   3. SVT (supraventricular tachycardia) (HCC)  COMP METABOLIC PANEL    LIPID PANEL   4. Essential hypertension  DILTIAZem CD (CARDIZEM CD) 120 MG CAPSULE SR 24 HR    lisinopril (PRINIVIL) 20 MG Tab       Medical Decision Making:  Today's Assessment / Status / Plan:   At this time patient is clinically stable in terms of her cardiac standpoint.  Cont current medications at current dose.     I will see patient back in clinic with lab tests and studies results in 6 months.    I thank you Dr. Lan for referring patient to our Cardiology Clinic today.        Jani Lan M.D.  [address] [facility]  [address]  [facility] [address]  VIA Facsimile: [phone]

## 2019-08-12 DIAGNOSIS — I10 ESSENTIAL HYPERTENSION: ICD-10-CM

## 2019-08-14 RX ORDER — DILTIAZEM HYDROCHLORIDE 120 MG/1
CAPSULE, EXTENDED RELEASE ORAL
Qty: 90 CAP | Refills: 0 | Status: SHIPPED | OUTPATIENT
Start: 2019-08-14 | End: 2019-08-16 | Stop reason: SDUPTHER

## 2019-08-14 RX ORDER — LISINOPRIL 20 MG/1
TABLET ORAL
Qty: 90 TAB | Refills: 0 | Status: SHIPPED | OUTPATIENT
Start: 2019-08-14 | End: 2019-08-16 | Stop reason: SDUPTHER

## 2019-08-16 ENCOUNTER — OFFICE VISIT (OUTPATIENT)
Dept: CARDIOLOGY | Facility: MEDICAL CENTER | Age: 74
End: 2019-08-16
Payer: MEDICARE

## 2019-08-16 VITALS
SYSTOLIC BLOOD PRESSURE: 126 MMHG | HEART RATE: 64 BPM | WEIGHT: 153 LBS | DIASTOLIC BLOOD PRESSURE: 70 MMHG | OXYGEN SATURATION: 92 % | HEIGHT: 62 IN | BODY MASS INDEX: 28.16 KG/M2

## 2019-08-16 DIAGNOSIS — I49.1 PAC (PREMATURE ATRIAL CONTRACTION): ICD-10-CM

## 2019-08-16 DIAGNOSIS — I47.10 SVT (SUPRAVENTRICULAR TACHYCARDIA): ICD-10-CM

## 2019-08-16 DIAGNOSIS — I49.3 PVC (PREMATURE VENTRICULAR CONTRACTION): ICD-10-CM

## 2019-08-16 PROCEDURE — 99214 OFFICE O/P EST MOD 30 MIN: CPT | Performed by: INTERNAL MEDICINE

## 2019-08-16 RX ORDER — LISINOPRIL 20 MG/1
20 TABLET ORAL DAILY
Qty: 90 TAB | Refills: 3 | Status: SHIPPED | OUTPATIENT
Start: 2019-08-16 | End: 2020-05-07 | Stop reason: SDUPTHER

## 2019-08-16 RX ORDER — DILTIAZEM HYDROCHLORIDE 120 MG/1
120 CAPSULE, COATED, EXTENDED RELEASE ORAL DAILY
Qty: 90 CAP | Refills: 3 | Status: SHIPPED | OUTPATIENT
Start: 2019-08-16 | End: 2020-05-07 | Stop reason: SDUPTHER

## 2019-08-16 ASSESSMENT — ENCOUNTER SYMPTOMS
ORTHOPNEA: 0
PND: 0
NAUSEA: 0
SENSORY CHANGE: 0
FEVER: 0
FALLS: 0
SHORTNESS OF BREATH: 0
ABDOMINAL PAIN: 0
DOUBLE VISION: 0
DIZZINESS: 0
HEADACHES: 0
HALLUCINATIONS: 0
LOSS OF CONSCIOUSNESS: 0
PALPITATIONS: 0
VOMITING: 0
MYALGIAS: 0
CLAUDICATION: 0
COUGH: 0
WEIGHT LOSS: 0
SPEECH CHANGE: 0
BLURRED VISION: 0
BRUISES/BLEEDS EASILY: 0
EYE DISCHARGE: 0
CHILLS: 0
EYE PAIN: 0
DEPRESSION: 0
BLOOD IN STOOL: 0

## 2019-08-16 NOTE — PROGRESS NOTES
Chief Complaint   Patient presents with   • Premature Ventricular Contractions (PVCs)     Follow up       Subjective:   Zo Sanders is a 74 y.o. female who presents today for cardiac care for occasional PAC and PVCs. No malignant arrhythmias. Patient has been feeling better these days. However, she does report having exertional dyspnea especially up inclines.     I have independently reviewed blood tests results with patient in clinic which shows normal LDL level 92, triglycerides 84, and liver function. GFR is 47.    Past Medical History:   Diagnosis Date   • Arrhythmia    • Hypertension      History reviewed. No pertinent surgical history.  Family History   Problem Relation Age of Onset   • Heart Disease Mother      Social History     Socioeconomic History   • Marital status:      Spouse name: Not on file   • Number of children: Not on file   • Years of education: Not on file   • Highest education level: Not on file   Occupational History   • Not on file   Social Needs   • Financial resource strain: Not on file   • Food insecurity:     Worry: Not on file     Inability: Not on file   • Transportation needs:     Medical: Not on file     Non-medical: Not on file   Tobacco Use   • Smoking status: Former Smoker     Last attempt to quit: 1971     Years since quittin.1   • Smokeless tobacco: Never Used   Substance and Sexual Activity   • Alcohol use: No   • Drug use: No   • Sexual activity: Not on file   Lifestyle   • Physical activity:     Days per week: Not on file     Minutes per session: Not on file   • Stress: Not on file   Relationships   • Social connections:     Talks on phone: Not on file     Gets together: Not on file     Attends Islam service: Not on file     Active member of club or organization: Not on file     Attends meetings of clubs or organizations: Not on file     Relationship status: Not on file   • Intimate partner violence:     Fear of current or ex partner: Not on file      "Emotionally abused: Not on file     Physically abused: Not on file     Forced sexual activity: Not on file   Other Topics Concern   • Not on file   Social History Narrative   • Not on file     No Known Allergies  Outpatient Encounter Medications as of 8/16/2019   Medication Sig Dispense Refill   • DILTIAZem CD (CARTIA XT) 120 MG CAPSULE SR 24 HR Take 1 Cap by mouth every day. 90 Cap 3   • lisinopril (PRINIVIL) 20 MG Tab Take 1 Tab by mouth every day. 90 Tab 3   • [DISCONTINUED] lisinopril (PRINIVIL) 20 MG Tab TAKE 1 TABLET BY MOUTH ONCE DAILY 90 Tab 0   • [DISCONTINUED] CARTIA  MG CAPSULE SR 24 HR TAKE 1 CAPSULE BY MOUTH ONCE DAILY 90 Cap 0     No facility-administered encounter medications on file as of 8/16/2019.      Review of Systems   Constitutional: Negative for chills, fever, malaise/fatigue and weight loss.   HENT: Negative for ear discharge, ear pain, hearing loss and nosebleeds.    Eyes: Negative for blurred vision, double vision, pain and discharge.   Respiratory: Negative for cough and shortness of breath.    Cardiovascular: Negative for chest pain, palpitations, orthopnea, claudication, leg swelling and PND.   Gastrointestinal: Negative for abdominal pain, blood in stool, melena, nausea and vomiting.   Genitourinary: Negative for dysuria and hematuria.   Musculoskeletal: Negative for falls, joint pain and myalgias.   Skin: Negative for itching and rash.   Neurological: Negative for dizziness, sensory change, speech change, loss of consciousness and headaches.   Endo/Heme/Allergies: Negative for environmental allergies. Does not bruise/bleed easily.   Psychiatric/Behavioral: Negative for depression, hallucinations and suicidal ideas.        Objective:   /70 (BP Location: Right arm, Patient Position: Sitting, BP Cuff Size: Large adult)   Pulse 64   Ht 1.575 m (5' 2\")   Wt 69.4 kg (153 lb)   SpO2 92%   BMI 27.98 kg/m²     Physical Exam   Constitutional: She is oriented to person, place, " and time. No distress.   HENT:   Head: Normocephalic and atraumatic.   Right Ear: External ear normal.   Left Ear: External ear normal.   Eyes: Right eye exhibits no discharge. Left eye exhibits no discharge.   Neck: No JVD present. No thyromegaly present.   Cardiovascular: Normal rate, regular rhythm, normal heart sounds and intact distal pulses. Exam reveals no gallop and no friction rub.   No murmur heard.  Pulmonary/Chest: Breath sounds normal. No respiratory distress.   Abdominal: Bowel sounds are normal. She exhibits no distension. There is no tenderness.   Musculoskeletal: She exhibits no edema or tenderness.   Neurological: She is alert and oriented to person, place, and time. No cranial nerve deficit.   Skin: Skin is warm and dry. She is not diaphoretic.   Psychiatric: She has a normal mood and affect. Her behavior is normal.   Nursing note and vitals reviewed.      Assessment:     1. PVC (premature ventricular contraction)     2. PAC (premature atrial contraction)     3. SVT (supraventricular tachycardia) (HCC)  Comp Metabolic Panel    LIPID PANEL   4. Essential hypertension  DILTIAZem CD (CARTIA XT) 120 MG CAPSULE SR 24 HR    lisinopril (PRINIVIL) 20 MG Tab       Medical Decision Making:  Today's Assessment / Status / Plan:   At this time patient is clinically stable in terms of her cardiac standpoint.  Cont current medications at current dose.      I will see patient back in clinic with lab tests and studies results in 12 months.     I thank you Dr. Lan for referring patient to our Cardiology Clinic today.

## 2020-03-03 ENCOUNTER — TELEPHONE (OUTPATIENT)
Dept: CARDIOLOGY | Facility: MEDICAL CENTER | Age: 75
End: 2020-03-03

## 2020-03-03 NOTE — TELEPHONE ENCOUNTER
LM to remind patient to complete fasting labs before upcoming appt with GLADIS Marshall on 03/13/2020.

## 2020-03-13 ENCOUNTER — OFFICE VISIT (OUTPATIENT)
Dept: CARDIOLOGY | Facility: MEDICAL CENTER | Age: 75
End: 2020-03-13
Payer: MEDICARE

## 2020-03-13 VITALS
SYSTOLIC BLOOD PRESSURE: 122 MMHG | WEIGHT: 154.2 LBS | HEART RATE: 70 BPM | OXYGEN SATURATION: 92 % | DIASTOLIC BLOOD PRESSURE: 70 MMHG | BODY MASS INDEX: 28.37 KG/M2 | HEIGHT: 62 IN

## 2020-03-13 DIAGNOSIS — I35.1 AORTIC VALVE INSUFFICIENCY, ETIOLOGY OF CARDIAC VALVE DISEASE UNSPECIFIED: ICD-10-CM

## 2020-03-13 DIAGNOSIS — R42 DIZZINESS: ICD-10-CM

## 2020-03-13 DIAGNOSIS — I47.10 SVT (SUPRAVENTRICULAR TACHYCARDIA) (HCC): ICD-10-CM

## 2020-03-13 DIAGNOSIS — I49.3 PVC (PREMATURE VENTRICULAR CONTRACTION): ICD-10-CM

## 2020-03-13 DIAGNOSIS — I49.1 PAC (PREMATURE ATRIAL CONTRACTION): ICD-10-CM

## 2020-03-13 DIAGNOSIS — I10 ESSENTIAL HYPERTENSION: ICD-10-CM

## 2020-03-13 DIAGNOSIS — R00.2 PALPITATIONS: ICD-10-CM

## 2020-03-13 LAB — EKG IMPRESSION: NORMAL

## 2020-03-13 PROCEDURE — 99214 OFFICE O/P EST MOD 30 MIN: CPT | Performed by: NURSE PRACTITIONER

## 2020-03-13 PROCEDURE — 93000 ELECTROCARDIOGRAM COMPLETE: CPT | Performed by: INTERNAL MEDICINE

## 2020-03-13 RX ORDER — TRIAMCINOLONE ACETONIDE 1 MG/G
1 CREAM TOPICAL PRN
COMMUNITY
Start: 2020-01-20

## 2020-03-13 ASSESSMENT — ENCOUNTER SYMPTOMS
ORTHOPNEA: 0
CLAUDICATION: 0
PND: 0
FEVER: 0
MYALGIAS: 0
SHORTNESS OF BREATH: 0
ABDOMINAL PAIN: 0
COUGH: 0
PALPITATIONS: 1
DIZZINESS: 1

## 2020-03-13 NOTE — PROGRESS NOTES
Chief Complaint   Patient presents with   • Premature Ventricular Contractions (PVCs)       Subjective:   Zo Sanders is a 74 y.o. female who presents today for follow-up on her palpitations with her , Dominick.    Patient of Dr. Nunez.  She was last seen in clinic on 2019.  During that visit, no changes were made to her medical regimen.  She was sent for lab testing.    Patient comes in today stating she has not had any symptoms of her palpitations/arrhythmia for almost 3 years this may, but states a couple of weeks ago she was sewing at home and had her symptoms return.  She reports she saw a flash in her vision and then felt her heart racing with lightheadedness.  She continues to have dizziness.  She otherwise denies chest pain, orthopnea, PND, edema or shortness of breath.    Additonally, patient has the following medical problems:    -Hypertension: Taking lisinopril and diltiazem    -History of PACs, PVCs    Past Medical History:   Diagnosis Date   • Arrhythmia    • Hypertension      History reviewed. No pertinent surgical history.  Family History   Problem Relation Age of Onset   • Heart Disease Mother      Social History     Socioeconomic History   • Marital status:      Spouse name: Not on file   • Number of children: Not on file   • Years of education: Not on file   • Highest education level: Not on file   Occupational History   • Not on file   Social Needs   • Financial resource strain: Not on file   • Food insecurity     Worry: Not on file     Inability: Not on file   • Transportation needs     Medical: Not on file     Non-medical: Not on file   Tobacco Use   • Smoking status: Former Smoker     Last attempt to quit: 1971     Years since quittin.6   • Smokeless tobacco: Never Used   Substance and Sexual Activity   • Alcohol use: No   • Drug use: No   • Sexual activity: Not on file   Lifestyle   • Physical activity     Days per week: Not on file     Minutes per session: Not on file  "  • Stress: Not on file   Relationships   • Social connections     Talks on phone: Not on file     Gets together: Not on file     Attends Jew service: Not on file     Active member of club or organization: Not on file     Attends meetings of clubs or organizations: Not on file     Relationship status: Not on file   • Intimate partner violence     Fear of current or ex partner: Not on file     Emotionally abused: Not on file     Physically abused: Not on file     Forced sexual activity: Not on file   Other Topics Concern   • Not on file   Social History Narrative   • Not on file     No Known Allergies  Outpatient Encounter Medications as of 3/13/2020   Medication Sig Dispense Refill   • DILTIAZem CD (CARTIA XT) 120 MG CAPSULE SR 24 HR Take 1 Cap by mouth every day. 90 Cap 3   • lisinopril (PRINIVIL) 20 MG Tab Take 1 Tab by mouth every day. 90 Tab 3     No facility-administered encounter medications on file as of 3/13/2020.      Review of Systems   Constitutional: Negative for fever and malaise/fatigue.   Eyes:        Sees flashes of light   Respiratory: Negative for cough and shortness of breath.    Cardiovascular: Positive for palpitations. Negative for chest pain, orthopnea, claudication, leg swelling and PND.   Gastrointestinal: Negative for abdominal pain.   Musculoskeletal: Negative for myalgias.   Neurological: Positive for dizziness.   All other systems reviewed and are negative.       Objective:   /70 (BP Location: Right arm, Patient Position: Sitting, BP Cuff Size: Adult)   Pulse 70   Ht 1.575 m (5' 2\")   Wt 69.9 kg (154 lb 3.2 oz)   SpO2 92%   BMI 28.20 kg/m²     Physical Exam   Constitutional: She is oriented to person, place, and time. She appears well-developed and well-nourished.   HENT:   Head: Normocephalic and atraumatic.   Eyes: Pupils are equal, round, and reactive to light. EOM are normal.   Neck: Normal range of motion. Neck supple. No JVD present.   Cardiovascular: Normal rate " and regular rhythm.   Murmur heard.   Systolic murmur is present with a grade of 2/6.  Pulmonary/Chest: Effort normal and breath sounds normal. No respiratory distress. She has no wheezes. She has no rales.   Abdominal: Soft. Bowel sounds are normal.   Musculoskeletal:         General: No edema.   Neurological: She is alert and oriented to person, place, and time.   Skin: Skin is warm and dry.   Psychiatric: She has a normal mood and affect. Her behavior is normal.   Vitals reviewed.    Lab Results   Component Value Date/Time    CHOLSTRLTOT 163 02/08/2018 11:53 AM    LDL 92 02/08/2018 11:53 AM    HDL 54 02/08/2018 11:53 AM    TRIGLYCERIDE 84 02/08/2018 11:53 AM       Lab Results   Component Value Date/Time    SODIUM 142 02/08/2018 11:53 AM    POTASSIUM 5.4 (H) 02/08/2018 11:53 AM    CHLORIDE 101 02/08/2018 11:53 AM    CO2 22 02/08/2018 11:53 AM    GLUCOSE 88 02/08/2018 11:53 AM    BUN 16 02/08/2018 11:53 AM    CREATININE 1.15 (H) 02/08/2018 11:53 AM    BUNCREATRAT 14 02/08/2018 11:53 AM     Lab Results   Component Value Date/Time    ALKPHOSPHAT 71 02/08/2018 11:53 AM    ASTSGOT 14 02/08/2018 11:53 AM    ALTSGPT 9 02/08/2018 11:53 AM    TBILIRUBIN 0.4 02/08/2018 11:53 AM        Recent labs done at [facility] on 2/28/2020    Other imaging studies in medical record    Assessment:     1. Palpitations  EKG    [facility] / Event Monitor   2. Dizziness  [facility] / Event Monitor   3. PVC (premature ventricular contraction)  [facility] / Event Monitor   4. PAC (premature atrial contraction)  [facility] / Event Monitor   5. SVT (supraventricular tachycardia) (HCC)  [facility] / Event Monitor   6. Essential hypertension     7. Aortic valve insufficiency, etiology of cardiac valve disease unspecified         Medical Decision Making:  Today's Assessment / Status / Plan:   1.  Palpitations and dizziness: Patient does have a history of PVCs and PACs, with patient is concerned over new event, will obtain an event monitor to evaluate  symptoms.  -Continue diltiazem  mg daily    2.  Hypertension: Stable BP appears to be well controlled  -Continue lisinopril 20 mg daily    3.  Aortic insufficiency per Echo: Asymptomatic  -Will follow    FU in clinic in 1 month after event monitor with Dr. Nunez or Myself. Sooner if needed.    Patient verbalizes understanding and agrees with the plan of care.     Collaborating MD: KEILY aRmírez MD

## 2020-04-09 ENCOUNTER — TELEPHONE (OUTPATIENT)
Dept: CARDIOLOGY | Facility: MEDICAL CENTER | Age: 75
End: 2020-04-09

## 2020-04-09 ENCOUNTER — NON-PROVIDER VISIT (OUTPATIENT)
Dept: CARDIOLOGY | Facility: MEDICAL CENTER | Age: 75
End: 2020-04-09
Payer: MEDICARE

## 2020-04-09 DIAGNOSIS — I49.3 PVC'S (PREMATURE VENTRICULAR CONTRACTIONS): ICD-10-CM

## 2020-04-09 DIAGNOSIS — R00.2 PALPITATIONS: ICD-10-CM

## 2020-04-09 NOTE — TELEPHONE ENCOUNTER
Patient enrolled in the 14 day Zio patch program per GLADIS Marshall. ( is patient's MD). In office hookup.   >Currently pending EOS.

## 2020-04-28 PROCEDURE — 0296T PR EXT ECG > 48HR TO 21 DAY RCRD W/CONECT INTL RCRD: CPT | Performed by: INTERNAL MEDICINE

## 2020-04-28 PROCEDURE — 0298T PR EXT ECG > 48HR TO 21 DAY REVIEW AND INTERPRETATN: CPT | Performed by: INTERNAL MEDICINE

## 2020-05-07 ENCOUNTER — TELEMEDICINE (OUTPATIENT)
Dept: CARDIOLOGY | Facility: MEDICAL CENTER | Age: 75
End: 2020-05-07
Payer: MEDICARE

## 2020-05-07 VITALS
DIASTOLIC BLOOD PRESSURE: 74 MMHG | HEIGHT: 61 IN | HEART RATE: 74 BPM | WEIGHT: 150 LBS | SYSTOLIC BLOOD PRESSURE: 142 MMHG | BODY MASS INDEX: 28.32 KG/M2

## 2020-05-07 DIAGNOSIS — I49.1 PAC (PREMATURE ATRIAL CONTRACTION): ICD-10-CM

## 2020-05-07 DIAGNOSIS — I10 ESSENTIAL HYPERTENSION: ICD-10-CM

## 2020-05-07 DIAGNOSIS — I49.3 PVC (PREMATURE VENTRICULAR CONTRACTION): ICD-10-CM

## 2020-05-07 DIAGNOSIS — Z79.899 HIGH RISK MEDICATION USE: ICD-10-CM

## 2020-05-07 DIAGNOSIS — I47.10 SVT (SUPRAVENTRICULAR TACHYCARDIA) (HCC): ICD-10-CM

## 2020-05-07 PROCEDURE — 99214 OFFICE O/P EST MOD 30 MIN: CPT | Mod: 95,CR | Performed by: INTERNAL MEDICINE

## 2020-05-07 RX ORDER — DILTIAZEM HYDROCHLORIDE 120 MG/1
120 CAPSULE, COATED, EXTENDED RELEASE ORAL DAILY
Qty: 90 CAP | Refills: 3 | Status: SHIPPED | OUTPATIENT
Start: 2020-05-07 | End: 2020-09-10 | Stop reason: SDUPTHER

## 2020-05-07 RX ORDER — LISINOPRIL 20 MG/1
20 TABLET ORAL DAILY
Qty: 90 TAB | Refills: 3 | Status: SHIPPED | OUTPATIENT
Start: 2020-05-07 | End: 2020-09-10 | Stop reason: SDUPTHER

## 2020-05-07 ASSESSMENT — ENCOUNTER SYMPTOMS
COUGH: 0
FEVER: 0
HALLUCINATIONS: 0
CLAUDICATION: 0
DOUBLE VISION: 0
ORTHOPNEA: 0
BLOOD IN STOOL: 0
PALPITATIONS: 0
ABDOMINAL PAIN: 0
HEADACHES: 0
EYE DISCHARGE: 0
SHORTNESS OF BREATH: 0
BRUISES/BLEEDS EASILY: 0
WEIGHT LOSS: 0
SPEECH CHANGE: 0
NAUSEA: 0
FALLS: 0
DEPRESSION: 0
MYALGIAS: 0
SENSORY CHANGE: 0
DIZZINESS: 0
LOSS OF CONSCIOUSNESS: 0
EYE PAIN: 0
PND: 0
CHILLS: 0
BLURRED VISION: 0
VOMITING: 0

## 2020-05-07 NOTE — PROGRESS NOTES
Chief Complaint   Patient presents with   • Premature Ventricular Contractions (PVCs)     VOD: F/U     Of note, this visit was done through telemedicine with video on demand through epic system.  This visit complies with Medicare guidelines during this current COVID-19 pandemic.    This encounter was conducted via Zoom.  Verbal consent was obtained. Patient's identity was verified.      Subjective:   Zo Sanders is a 74 y.o. female who presents today for cardiac care for occasional PAC and PVCs. No malignant arrhythmias seen on recent Zio patch. Patient has been feeling better these days. However, she does report having exertional dyspnea especially up inclines.     I have independently reviewed blood tests results with patient in clinic which shows normal LDL level 96.    Past Medical History:   Diagnosis Date   • Arrhythmia    • Hypertension      No past surgical history on file.  Family History   Problem Relation Age of Onset   • Heart Disease Mother      Social History     Socioeconomic History   • Marital status:      Spouse name: Not on file   • Number of children: Not on file   • Years of education: Not on file   • Highest education level: Not on file   Occupational History   • Not on file   Social Needs   • Financial resource strain: Not on file   • Food insecurity     Worry: Not on file     Inability: Not on file   • Transportation needs     Medical: Not on file     Non-medical: Not on file   Tobacco Use   • Smoking status: Former Smoker     Last attempt to quit: 1971     Years since quittin.8   • Smokeless tobacco: Never Used   Substance and Sexual Activity   • Alcohol use: No   • Drug use: No   • Sexual activity: Not on file   Lifestyle   • Physical activity     Days per week: Not on file     Minutes per session: Not on file   • Stress: Not on file   Relationships   • Social connections     Talks on phone: Not on file     Gets together: Not on file     Attends Jain service: Not on  file     Active member of club or organization: Not on file     Attends meetings of clubs or organizations: Not on file     Relationship status: Not on file   • Intimate partner violence     Fear of current or ex partner: Not on file     Emotionally abused: Not on file     Physically abused: Not on file     Forced sexual activity: Not on file   Other Topics Concern   • Not on file   Social History Narrative   • Not on file     No Known Allergies  Outpatient Encounter Medications as of 5/7/2020   Medication Sig Dispense Refill   • lisinopril (PRINIVIL) 20 MG Tab Take 1 Tab by mouth every day. 90 Tab 3   • DILTIAZem CD (CARTIA XT) 120 MG CAPSULE SR 24 HR Take 1 Cap by mouth every day. 90 Cap 3   • triamcinolone acetonide (KENALOG) 0.1 % Cream Apply 1 Application to affected area(s) every day.     • [DISCONTINUED] DILTIAZem CD (CARTIA XT) 120 MG CAPSULE SR 24 HR Take 1 Cap by mouth every day. 90 Cap 3   • [DISCONTINUED] lisinopril (PRINIVIL) 20 MG Tab Take 1 Tab by mouth every day. 90 Tab 3     No facility-administered encounter medications on file as of 5/7/2020.      Review of Systems   Constitutional: Negative for chills, fever, malaise/fatigue and weight loss.   HENT: Negative for ear discharge, ear pain, hearing loss and nosebleeds.    Eyes: Negative for blurred vision, double vision, pain and discharge.   Respiratory: Negative for cough and shortness of breath.    Cardiovascular: Negative for chest pain, palpitations, orthopnea, claudication, leg swelling and PND.   Gastrointestinal: Negative for abdominal pain, blood in stool, melena, nausea and vomiting.   Genitourinary: Negative for dysuria and hematuria.   Musculoskeletal: Negative for falls, joint pain and myalgias.   Skin: Negative for itching and rash.   Neurological: Negative for dizziness, sensory change, speech change, loss of consciousness and headaches.   Endo/Heme/Allergies: Negative for environmental allergies. Does not bruise/bleed easily.  "  Psychiatric/Behavioral: Negative for depression, hallucinations and suicidal ideas.        Objective:   /74 (BP Location: Left arm, Patient Position: Sitting, BP Cuff Size: Adult)   Pulse 74   Ht 1.549 m (5' 1\")   Wt 68 kg (150 lb)   BMI 28.34 kg/m²     Physical Exam  Constitutional:   Well appearing, no acute distress  Heart: no pitting edema in BLE.  Lungs: no breathing distress, not tachypneic  Abdomen: no distention  Neurology: no signs of focal deficits.  Mentation is alert.    Assessment:     1. SVT (supraventricular tachycardia) (HCC)     2. PVC (premature ventricular contraction)     3. PAC (premature atrial contraction)     4. High risk medication use     5. Essential hypertension         Medical Decision Making:  Today's Assessment / Status / Plan:   At this time patient is clinically stable in terms of her cardiac standpoint.  Continue current medications at current dose as above. Refills were prescribed today and patient was instructed with plan of care.  No further cardiac work up.  "

## 2020-09-10 ENCOUNTER — OFFICE VISIT (OUTPATIENT)
Dept: CARDIOLOGY | Facility: MEDICAL CENTER | Age: 75
End: 2020-09-10
Payer: MEDICARE

## 2020-09-10 VITALS
SYSTOLIC BLOOD PRESSURE: 122 MMHG | HEART RATE: 70 BPM | RESPIRATION RATE: 95 BRPM | DIASTOLIC BLOOD PRESSURE: 76 MMHG | HEIGHT: 62 IN | WEIGHT: 152.6 LBS | BODY MASS INDEX: 28.08 KG/M2

## 2020-09-10 DIAGNOSIS — Z79.899 HIGH RISK MEDICATION USE: ICD-10-CM

## 2020-09-10 DIAGNOSIS — I47.10 SVT (SUPRAVENTRICULAR TACHYCARDIA) (HCC): ICD-10-CM

## 2020-09-10 DIAGNOSIS — I49.1 PAC (PREMATURE ATRIAL CONTRACTION): ICD-10-CM

## 2020-09-10 DIAGNOSIS — I10 HTN (HYPERTENSION), MALIGNANT: ICD-10-CM

## 2020-09-10 DIAGNOSIS — I49.3 PVC (PREMATURE VENTRICULAR CONTRACTION): ICD-10-CM

## 2020-09-10 PROCEDURE — 99214 OFFICE O/P EST MOD 30 MIN: CPT | Performed by: INTERNAL MEDICINE

## 2020-09-10 RX ORDER — DILTIAZEM HYDROCHLORIDE 120 MG/1
120 CAPSULE, COATED, EXTENDED RELEASE ORAL DAILY
Qty: 90 CAP | Refills: 3 | Status: SHIPPED | OUTPATIENT
Start: 2020-09-10 | End: 2021-05-14

## 2020-09-10 RX ORDER — LISINOPRIL 20 MG/1
20 TABLET ORAL DAILY
Qty: 90 TAB | Refills: 3 | Status: SHIPPED | OUTPATIENT
Start: 2020-09-10 | End: 2021-05-10

## 2020-09-10 ASSESSMENT — ENCOUNTER SYMPTOMS
BRUISES/BLEEDS EASILY: 0
ABDOMINAL PAIN: 0
FALLS: 0
PALPITATIONS: 0
HALLUCINATIONS: 0
PND: 0
NAUSEA: 0
DIZZINESS: 0
EYE PAIN: 0
COUGH: 0
SENSORY CHANGE: 0
MYALGIAS: 0
EYE DISCHARGE: 0
VOMITING: 0
LOSS OF CONSCIOUSNESS: 0
DEPRESSION: 0
CHILLS: 0
WEIGHT LOSS: 0
BLURRED VISION: 0
FEVER: 0
SPEECH CHANGE: 0
DOUBLE VISION: 0
BLOOD IN STOOL: 0
CLAUDICATION: 0
SHORTNESS OF BREATH: 0
ORTHOPNEA: 0
HEADACHES: 0

## 2020-09-10 NOTE — PROGRESS NOTES
Chief Complaint   Patient presents with   • Premature Ventricular Contractions (PVCs)       Subjective:   Zo Sanders is a 75 y.o. female who presents today for cardiac care for occasional PAC and PVCs. No malignant arrhythmias.     Patient has been feeling better these days. No chest pain. No dyspnea.     Past Medical History:   Diagnosis Date   • Arrhythmia    • Hypertension      History reviewed. No pertinent surgical history.  Family History   Problem Relation Age of Onset   • Heart Disease Mother      Social History     Socioeconomic History   • Marital status:      Spouse name: Not on file   • Number of children: Not on file   • Years of education: Not on file   • Highest education level: Not on file   Occupational History   • Not on file   Social Needs   • Financial resource strain: Not on file   • Food insecurity     Worry: Not on file     Inability: Not on file   • Transportation needs     Medical: Not on file     Non-medical: Not on file   Tobacco Use   • Smoking status: Former Smoker     Quit date: 1971     Years since quittin.1   • Smokeless tobacco: Never Used   Substance and Sexual Activity   • Alcohol use: No   • Drug use: No   • Sexual activity: Not on file   Lifestyle   • Physical activity     Days per week: Not on file     Minutes per session: Not on file   • Stress: Not on file   Relationships   • Social connections     Talks on phone: Not on file     Gets together: Not on file     Attends Jainism service: Not on file     Active member of club or organization: Not on file     Attends meetings of clubs or organizations: Not on file     Relationship status: Not on file   • Intimate partner violence     Fear of current or ex partner: Not on file     Emotionally abused: Not on file     Physically abused: Not on file     Forced sexual activity: Not on file   Other Topics Concern   • Not on file   Social History Narrative   • Not on file     No Known Allergies  Outpatient Encounter  "Medications as of 9/10/2020   Medication Sig Dispense Refill   • lisinopril (PRINIVIL) 20 MG Tab Take 1 Tab by mouth every day. 90 Tab 3   • DILTIAZem CD (CARTIA XT) 120 MG CAPSULE SR 24 HR Take 1 Cap by mouth every day. 90 Cap 3   • triamcinolone acetonide (KENALOG) 0.1 % Cream Apply 1 Application to affected area(s) every day.     • [DISCONTINUED] lisinopril (PRINIVIL) 20 MG Tab Take 1 Tab by mouth every day. 90 Tab 3   • [DISCONTINUED] DILTIAZem CD (CARTIA XT) 120 MG CAPSULE SR 24 HR Take 1 Cap by mouth every day. 90 Cap 3     No facility-administered encounter medications on file as of 9/10/2020.      Review of Systems   Constitutional: Negative for chills, fever, malaise/fatigue and weight loss.   HENT: Negative for ear discharge, ear pain, hearing loss and nosebleeds.    Eyes: Negative for blurred vision, double vision, pain and discharge.   Respiratory: Negative for cough and shortness of breath.    Cardiovascular: Negative for chest pain, palpitations, orthopnea, claudication, leg swelling and PND.   Gastrointestinal: Negative for abdominal pain, blood in stool, melena, nausea and vomiting.   Genitourinary: Negative for dysuria and hematuria.   Musculoskeletal: Negative for falls, joint pain and myalgias.   Skin: Negative for itching and rash.   Neurological: Negative for dizziness, sensory change, speech change, loss of consciousness and headaches.   Endo/Heme/Allergies: Negative for environmental allergies. Does not bruise/bleed easily.   Psychiatric/Behavioral: Negative for depression, hallucinations and suicidal ideas.        Objective:   /76 (BP Location: Left arm, Patient Position: Sitting, BP Cuff Size: Adult)   Pulse 70   Resp (!) 95   Ht 1.575 m (5' 2\")   Wt 69.2 kg (152 lb 9.6 oz)   BMI 27.91 kg/m²     Physical Exam   Constitutional: She is oriented to person, place, and time. No distress.   HENT:   Head: Normocephalic and atraumatic.   Right Ear: External ear normal.   Left Ear: " External ear normal.   Eyes: Right eye exhibits no discharge. Left eye exhibits no discharge.   Neck: No JVD present. No thyromegaly present.   Cardiovascular: Normal rate, regular rhythm, normal heart sounds and intact distal pulses. Exam reveals no gallop and no friction rub.   No murmur heard.  Pulmonary/Chest: Breath sounds normal. No respiratory distress.   Abdominal: Bowel sounds are normal. She exhibits no distension. There is no abdominal tenderness.   Musculoskeletal:         General: No tenderness or edema.   Neurological: She is alert and oriented to person, place, and time. No cranial nerve deficit.   Skin: Skin is warm and dry. She is not diaphoretic.   Psychiatric: She has a normal mood and affect. Her behavior is normal.   Nursing note and vitals reviewed.      Assessment:     1. SVT (supraventricular tachycardia) (HCC)     2. PVC (premature ventricular contraction)     3. PAC (premature atrial contraction)     4. HTN (hypertension), malignant  LIPID PANEL    Comp Metabolic Panel   5. High risk medication use         Medical Decision Making:  Today's Assessment / Status / Plan:   At this time patient is clinically stable in terms of her cardiac standpoint.  Blood pressure is well controlled.  Continue current medications at current dose as above. Refills were prescribed today and patient was instructed with plan of care.       I will see patient back in clinic with lab tests and studies results in 12 months.     I thank you Dr. Lan for referring patient to our Cardiology Clinic today.

## 2020-11-11 ENCOUNTER — HOSPITAL ENCOUNTER (OUTPATIENT)
Dept: RADIOLOGY | Facility: MEDICAL CENTER | Age: 75
End: 2020-11-11
Attending: PHYSICIAN ASSISTANT
Payer: MEDICARE

## 2020-11-11 DIAGNOSIS — N39.0 URINARY TRACT INFECTION WITHOUT HEMATURIA, SITE UNSPECIFIED: ICD-10-CM

## 2020-11-11 PROCEDURE — 76775 US EXAM ABDO BACK WALL LIM: CPT

## 2020-11-13 ENCOUNTER — HOSPITAL ENCOUNTER (OUTPATIENT)
Dept: LAB | Facility: MEDICAL CENTER | Age: 75
End: 2020-11-13
Attending: INTERNAL MEDICINE
Payer: MEDICARE

## 2020-11-13 DIAGNOSIS — I10 HTN (HYPERTENSION), MALIGNANT: ICD-10-CM

## 2020-11-13 LAB
ALBUMIN SERPL BCP-MCNC: 4.7 G/DL (ref 3.2–4.9)
ALBUMIN/GLOB SERPL: 1.6 G/DL
ALP SERPL-CCNC: 92 U/L (ref 30–99)
ALT SERPL-CCNC: 15 U/L (ref 2–50)
ANION GAP SERPL CALC-SCNC: 13 MMOL/L (ref 7–16)
AST SERPL-CCNC: 18 U/L (ref 12–45)
BILIRUB SERPL-MCNC: 0.4 MG/DL (ref 0.1–1.5)
BUN SERPL-MCNC: 22 MG/DL (ref 8–22)
CALCIUM SERPL-MCNC: 10.1 MG/DL (ref 8.5–10.5)
CHLORIDE SERPL-SCNC: 103 MMOL/L (ref 96–112)
CO2 SERPL-SCNC: 22 MMOL/L (ref 20–33)
CREAT SERPL-MCNC: 1.43 MG/DL (ref 0.5–1.4)
FASTING STATUS PATIENT QL REPORTED: NORMAL
GLOBULIN SER CALC-MCNC: 3 G/DL (ref 1.9–3.5)
GLUCOSE SERPL-MCNC: 87 MG/DL (ref 65–99)
POTASSIUM SERPL-SCNC: 5 MMOL/L (ref 3.6–5.5)
PROT SERPL-MCNC: 7.7 G/DL (ref 6–8.2)
SODIUM SERPL-SCNC: 138 MMOL/L (ref 135–145)

## 2020-11-13 PROCEDURE — 80053 COMPREHEN METABOLIC PANEL: CPT

## 2020-11-13 PROCEDURE — 36415 COLL VENOUS BLD VENIPUNCTURE: CPT

## 2020-11-16 ENCOUNTER — HOSPITAL ENCOUNTER (OUTPATIENT)
Dept: RADIOLOGY | Facility: MEDICAL CENTER | Age: 75
End: 2020-11-16
Attending: PHYSICIAN ASSISTANT
Payer: MEDICARE

## 2020-11-16 DIAGNOSIS — N13.30 HYDRONEPHROSIS, UNSPECIFIED HYDRONEPHROSIS TYPE: ICD-10-CM

## 2020-11-16 PROCEDURE — 700117 HCHG RX CONTRAST REV CODE 255: Performed by: PHYSICIAN ASSISTANT

## 2020-11-16 PROCEDURE — 74178 CT ABD&PLV WO CNTR FLWD CNTR: CPT

## 2020-11-16 RX ADMIN — IOHEXOL 100 ML: 350 INJECTION, SOLUTION INTRAVENOUS at 11:09

## 2021-01-07 ENCOUNTER — HOSPITAL ENCOUNTER (OUTPATIENT)
Dept: RADIOLOGY | Facility: MEDICAL CENTER | Age: 76
End: 2021-01-07
Attending: UROLOGY
Payer: MEDICARE

## 2021-01-07 DIAGNOSIS — N13.30 HYDRONEPHROSIS, UNSPECIFIED HYDRONEPHROSIS TYPE: ICD-10-CM

## 2021-01-07 PROCEDURE — 700111 HCHG RX REV CODE 636 W/ 250 OVERRIDE (IP)

## 2021-01-07 PROCEDURE — 78708 K FLOW/FUNCT IMAGE W/DRUG: CPT

## 2021-01-07 RX ORDER — FUROSEMIDE 10 MG/ML
INJECTION INTRAMUSCULAR; INTRAVENOUS
Status: COMPLETED
Start: 2021-01-07 | End: 2021-01-07

## 2021-01-07 RX ADMIN — FUROSEMIDE 20 MG: 10 INJECTION, SOLUTION INTRAMUSCULAR; INTRAVENOUS at 10:18

## 2021-01-12 DIAGNOSIS — Z23 NEED FOR VACCINATION: ICD-10-CM

## 2021-01-22 ENCOUNTER — IMMUNIZATION (OUTPATIENT)
Dept: FAMILY PLANNING/WOMEN'S HEALTH CLINIC | Facility: IMMUNIZATION CENTER | Age: 76
End: 2021-01-22
Attending: INTERNAL MEDICINE
Payer: MEDICARE

## 2021-01-22 DIAGNOSIS — Z23 ENCOUNTER FOR VACCINATION: Primary | ICD-10-CM

## 2021-01-22 DIAGNOSIS — Z23 NEED FOR VACCINATION: ICD-10-CM

## 2021-01-22 PROCEDURE — 0001A PFIZER SARS-COV-2 VACCINE: CPT | Performed by: NURSE PRACTITIONER

## 2021-01-22 PROCEDURE — 91300 PFIZER SARS-COV-2 VACCINE: CPT | Performed by: NURSE PRACTITIONER

## 2021-02-08 ENCOUNTER — TELEPHONE (OUTPATIENT)
Dept: CARDIOLOGY | Facility: MEDICAL CENTER | Age: 76
End: 2021-02-08

## 2021-02-08 NOTE — TELEPHONE ENCOUNTER
TT    Pt called stating diltiazem can cause kidney damage and Pt only has 1 kidney. Pt wants to know if she should still take this medication. Please call Pt back at [phone]. Pt states she will not be home from 1p to 3:30pm.

## 2021-02-08 NOTE — TELEPHONE ENCOUNTER
Tonia Nunez M.D.  You 1 hour ago (12:13 PM)     Yes relatively safe, ok to proceed with close monitor.     Tonia Nunez M.D.    Message text      Called pt and informed her.

## 2021-02-10 ENCOUNTER — HOSPITAL ENCOUNTER (OUTPATIENT)
Facility: MEDICAL CENTER | Age: 76
End: 2021-02-10
Attending: UROLOGY
Payer: MEDICARE

## 2021-02-10 LAB
ANION GAP SERPL CALC-SCNC: 12 MMOL/L (ref 7–16)
BASOPHILS # BLD AUTO: 0.6 % (ref 0–1.8)
BASOPHILS # BLD: 0.03 K/UL (ref 0–0.12)
BUN SERPL-MCNC: 26 MG/DL (ref 8–22)
CALCIUM SERPL-MCNC: 9.8 MG/DL (ref 8.5–10.5)
CHLORIDE SERPL-SCNC: 104 MMOL/L (ref 96–112)
CO2 SERPL-SCNC: 22 MMOL/L (ref 20–33)
CREAT SERPL-MCNC: 1.63 MG/DL (ref 0.5–1.4)
EOSINOPHIL # BLD AUTO: 0.2 K/UL (ref 0–0.51)
EOSINOPHIL NFR BLD: 4.1 % (ref 0–6.9)
ERYTHROCYTE [DISTWIDTH] IN BLOOD BY AUTOMATED COUNT: 47.6 FL (ref 35.9–50)
GLUCOSE SERPL-MCNC: 102 MG/DL (ref 65–99)
HCT VFR BLD AUTO: 41.2 % (ref 37–47)
HGB BLD-MCNC: 12.7 G/DL (ref 12–16)
IMM GRANULOCYTES # BLD AUTO: 0.01 K/UL (ref 0–0.11)
IMM GRANULOCYTES NFR BLD AUTO: 0.2 % (ref 0–0.9)
LYMPHOCYTES # BLD AUTO: 1.3 K/UL (ref 1–4.8)
LYMPHOCYTES NFR BLD: 26.7 % (ref 22–41)
MCH RBC QN AUTO: 31 PG (ref 27–33)
MCHC RBC AUTO-ENTMCNC: 30.8 G/DL (ref 33.6–35)
MCV RBC AUTO: 100.5 FL (ref 81.4–97.8)
MONOCYTES # BLD AUTO: 0.29 K/UL (ref 0–0.85)
MONOCYTES NFR BLD AUTO: 6 % (ref 0–13.4)
NEUTROPHILS # BLD AUTO: 3.04 K/UL (ref 2–7.15)
NEUTROPHILS NFR BLD: 62.4 % (ref 44–72)
NRBC # BLD AUTO: 0 K/UL
NRBC BLD-RTO: 0 /100 WBC
PLATELET # BLD AUTO: 227 K/UL (ref 164–446)
PMV BLD AUTO: 10.5 FL (ref 9–12.9)
POTASSIUM SERPL-SCNC: 5.3 MMOL/L (ref 3.6–5.5)
RBC # BLD AUTO: 4.1 M/UL (ref 4.2–5.4)
SODIUM SERPL-SCNC: 138 MMOL/L (ref 135–145)
WBC # BLD AUTO: 4.9 K/UL (ref 4.8–10.8)

## 2021-02-10 PROCEDURE — 85025 COMPLETE CBC W/AUTO DIFF WBC: CPT

## 2021-02-10 PROCEDURE — 80048 BASIC METABOLIC PNL TOTAL CA: CPT

## 2021-02-11 ENCOUNTER — PRE-ADMISSION TESTING (OUTPATIENT)
Dept: ADMISSIONS | Facility: MEDICAL CENTER | Age: 76
End: 2021-02-11
Attending: UROLOGY
Payer: MEDICARE

## 2021-02-11 DIAGNOSIS — Z01.812 PRE-OPERATIVE LABORATORY EXAMINATION: ICD-10-CM

## 2021-02-11 PROCEDURE — U0003 INFECTIOUS AGENT DETECTION BY NUCLEIC ACID (DNA OR RNA); SEVERE ACUTE RESPIRATORY SYNDROME CORONAVIRUS 2 (SARS-COV-2) (CORONAVIRUS DISEASE [COVID-19]), AMPLIFIED PROBE TECHNIQUE, MAKING USE OF HIGH THROUGHPUT TECHNOLOGIES AS DESCRIBED BY CMS-2020-01-R: HCPCS

## 2021-02-11 PROCEDURE — U0005 INFEC AGEN DETEC AMPLI PROBE: HCPCS

## 2021-02-11 PROCEDURE — C9803 HOPD COVID-19 SPEC COLLECT: HCPCS

## 2021-02-11 PROCEDURE — 93005 ELECTROCARDIOGRAM TRACING: CPT

## 2021-02-11 RX ORDER — CEPHALEXIN 250 MG/1
CAPSULE ORAL
Status: ON HOLD | COMMUNITY
Start: 2021-02-03 | End: 2021-02-16 | Stop reason: SDUPTHER

## 2021-02-11 ASSESSMENT — FIBROSIS 4 INDEX: FIB4 SCORE: 1.56

## 2021-02-11 NOTE — PREPROCEDURE INSTRUCTIONS
"Pre-admit appointment completed. \"Preparing for your Procedure\" sheet given to Pt with verbal and written instructions. Pt states all instructions given are understood and to call pre-admit or Dr's office for additional questions or any symptoms of illness/covid develop prior to DOS. Self isolation instructions for after the Covid test given to patient. Medications the patient will take the morning of surgery per anesthesia protocol: None and instructed to take prescription medications through the day before surgery.    Denies any anesthesia complications      "

## 2021-02-12 ENCOUNTER — IMMUNIZATION (OUTPATIENT)
Dept: FAMILY PLANNING/WOMEN'S HEALTH CLINIC | Facility: IMMUNIZATION CENTER | Age: 76
End: 2021-02-12
Attending: INTERNAL MEDICINE
Payer: MEDICARE

## 2021-02-12 DIAGNOSIS — Z23 ENCOUNTER FOR VACCINATION: Primary | ICD-10-CM

## 2021-02-12 LAB
EKG IMPRESSION: NORMAL
SARS-COV-2 RNA RESP QL NAA+PROBE: NOTDETECTED
SPECIMEN SOURCE: NORMAL

## 2021-02-12 PROCEDURE — 0002A PFIZER SARS-COV-2 VACCINE: CPT

## 2021-02-12 PROCEDURE — 93010 ELECTROCARDIOGRAM REPORT: CPT | Performed by: INTERNAL MEDICINE

## 2021-02-12 PROCEDURE — 91300 PFIZER SARS-COV-2 VACCINE: CPT

## 2021-02-16 ENCOUNTER — APPOINTMENT (OUTPATIENT)
Dept: RADIOLOGY | Facility: MEDICAL CENTER | Age: 76
End: 2021-02-16
Attending: UROLOGY
Payer: MEDICARE

## 2021-02-16 ENCOUNTER — HOSPITAL ENCOUNTER (OUTPATIENT)
Facility: MEDICAL CENTER | Age: 76
End: 2021-02-16
Attending: UROLOGY | Admitting: UROLOGY
Payer: MEDICARE

## 2021-02-16 ENCOUNTER — ANESTHESIA (OUTPATIENT)
Dept: SURGERY | Facility: MEDICAL CENTER | Age: 76
End: 2021-02-16
Payer: MEDICARE

## 2021-02-16 ENCOUNTER — ANESTHESIA EVENT (OUTPATIENT)
Dept: SURGERY | Facility: MEDICAL CENTER | Age: 76
End: 2021-02-16
Payer: MEDICARE

## 2021-02-16 VITALS
BODY MASS INDEX: 27.43 KG/M2 | WEIGHT: 149.03 LBS | HEIGHT: 62 IN | RESPIRATION RATE: 18 BRPM | SYSTOLIC BLOOD PRESSURE: 137 MMHG | HEART RATE: 56 BPM | OXYGEN SATURATION: 99 % | TEMPERATURE: 97.2 F | DIASTOLIC BLOOD PRESSURE: 85 MMHG

## 2021-02-16 PROBLEM — N13.30 HYDRONEPHROSIS: Status: ACTIVE | Noted: 2021-02-16

## 2021-02-16 LAB — PATHOLOGY CONSULT NOTE: NORMAL

## 2021-02-16 PROCEDURE — 160002 HCHG RECOVERY MINUTES (STAT): Performed by: UROLOGY

## 2021-02-16 PROCEDURE — 160025 RECOVERY II MINUTES (STATS): Performed by: UROLOGY

## 2021-02-16 PROCEDURE — 700101 HCHG RX REV CODE 250: Performed by: UROLOGY

## 2021-02-16 PROCEDURE — A9270 NON-COVERED ITEM OR SERVICE: HCPCS | Performed by: UROLOGY

## 2021-02-16 PROCEDURE — C1758 CATHETER, URETERAL: HCPCS | Performed by: UROLOGY

## 2021-02-16 PROCEDURE — 501838 HCHG SUTURE GENERAL: Performed by: UROLOGY

## 2021-02-16 PROCEDURE — 160035 HCHG PACU - 1ST 60 MINS PHASE I: Performed by: UROLOGY

## 2021-02-16 PROCEDURE — 74018 RADEX ABDOMEN 1 VIEW: CPT

## 2021-02-16 PROCEDURE — 700117 HCHG RX CONTRAST REV CODE 255: Performed by: UROLOGY

## 2021-02-16 PROCEDURE — 500879 HCHG PACK, CYSTO: Performed by: UROLOGY

## 2021-02-16 PROCEDURE — 700111 HCHG RX REV CODE 636 W/ 250 OVERRIDE (IP): Performed by: ANESTHESIOLOGY

## 2021-02-16 PROCEDURE — 160047 HCHG PACU  - EA ADDL 30 MINS PHASE II: Performed by: UROLOGY

## 2021-02-16 PROCEDURE — 160036 HCHG PACU - EA ADDL 30 MINS PHASE I: Performed by: UROLOGY

## 2021-02-16 PROCEDURE — 700102 HCHG RX REV CODE 250 W/ 637 OVERRIDE(OP): Performed by: ANESTHESIOLOGY

## 2021-02-16 PROCEDURE — 501329 HCHG SET, CYSTO IRRIG Y TUR: Performed by: UROLOGY

## 2021-02-16 PROCEDURE — 500062 HCHG BASKET: Performed by: UROLOGY

## 2021-02-16 PROCEDURE — 160028 HCHG SURGERY MINUTES - 1ST 30 MINS LEVEL 3: Performed by: UROLOGY

## 2021-02-16 PROCEDURE — 160046 HCHG PACU - 1ST 60 MINS PHASE II: Performed by: UROLOGY

## 2021-02-16 PROCEDURE — A9270 NON-COVERED ITEM OR SERVICE: HCPCS | Performed by: ANESTHESIOLOGY

## 2021-02-16 PROCEDURE — 88305 TISSUE EXAM BY PATHOLOGIST: CPT

## 2021-02-16 PROCEDURE — 160048 HCHG OR STATISTICAL LEVEL 1-5: Performed by: UROLOGY

## 2021-02-16 PROCEDURE — 700105 HCHG RX REV CODE 258: Performed by: UROLOGY

## 2021-02-16 PROCEDURE — 160039 HCHG SURGERY MINUTES - EA ADDL 1 MIN LEVEL 3: Performed by: UROLOGY

## 2021-02-16 PROCEDURE — 160009 HCHG ANES TIME/MIN: Performed by: UROLOGY

## 2021-02-16 PROCEDURE — 700101 HCHG RX REV CODE 250: Performed by: ANESTHESIOLOGY

## 2021-02-16 PROCEDURE — C1769 GUIDE WIRE: HCPCS | Performed by: UROLOGY

## 2021-02-16 RX ORDER — LIDOCAINE HYDROCHLORIDE 20 MG/ML
INJECTION, SOLUTION EPIDURAL; INFILTRATION; INTRACAUDAL; PERINEURAL PRN
Status: DISCONTINUED | OUTPATIENT
Start: 2021-02-16 | End: 2021-02-16 | Stop reason: SURG

## 2021-02-16 RX ORDER — CEPHALEXIN 250 MG/1
250 CAPSULE ORAL DAILY
Qty: 90 CAPSULE | Refills: 3 | Status: SHIPPED | OUTPATIENT
Start: 2021-02-16 | End: 2021-05-14

## 2021-02-16 RX ORDER — CEFAZOLIN SODIUM 1 G/3ML
INJECTION, POWDER, FOR SOLUTION INTRAMUSCULAR; INTRAVENOUS PRN
Status: DISCONTINUED | OUTPATIENT
Start: 2021-02-16 | End: 2021-02-16 | Stop reason: SURG

## 2021-02-16 RX ORDER — SODIUM CHLORIDE, SODIUM LACTATE, POTASSIUM CHLORIDE, CALCIUM CHLORIDE 600; 310; 30; 20 MG/100ML; MG/100ML; MG/100ML; MG/100ML
INJECTION, SOLUTION INTRAVENOUS CONTINUOUS
Status: DISCONTINUED | OUTPATIENT
Start: 2021-02-16 | End: 2021-02-16 | Stop reason: HOSPADM

## 2021-02-16 RX ORDER — ACETAMINOPHEN 500 MG
1000 TABLET ORAL ONCE
Status: COMPLETED | OUTPATIENT
Start: 2021-02-16 | End: 2021-02-16

## 2021-02-16 RX ORDER — ONDANSETRON 2 MG/ML
INJECTION INTRAMUSCULAR; INTRAVENOUS PRN
Status: DISCONTINUED | OUTPATIENT
Start: 2021-02-16 | End: 2021-02-16 | Stop reason: SURG

## 2021-02-16 RX ORDER — ONDANSETRON 2 MG/ML
4 INJECTION INTRAMUSCULAR; INTRAVENOUS
Status: DISCONTINUED | OUTPATIENT
Start: 2021-02-16 | End: 2021-02-16 | Stop reason: HOSPADM

## 2021-02-16 RX ORDER — DEXAMETHASONE SODIUM PHOSPHATE 4 MG/ML
INJECTION, SOLUTION INTRA-ARTICULAR; INTRALESIONAL; INTRAMUSCULAR; INTRAVENOUS; SOFT TISSUE PRN
Status: DISCONTINUED | OUTPATIENT
Start: 2021-02-16 | End: 2021-02-16 | Stop reason: SURG

## 2021-02-16 RX ORDER — ATROPA BELLADONNA AND OPIUM 16.2; 6 MG/1; MG/1
60 SUPPOSITORY RECTAL
Status: DISCONTINUED | OUTPATIENT
Start: 2021-02-16 | End: 2021-02-16 | Stop reason: HOSPADM

## 2021-02-16 RX ORDER — SODIUM CHLORIDE, SODIUM LACTATE, POTASSIUM CHLORIDE, CALCIUM CHLORIDE 600; 310; 30; 20 MG/100ML; MG/100ML; MG/100ML; MG/100ML
INJECTION, SOLUTION INTRAVENOUS CONTINUOUS
Status: ACTIVE | OUTPATIENT
Start: 2021-02-16 | End: 2021-02-16

## 2021-02-16 RX ORDER — OXYCODONE HCL 5 MG/5 ML
10 SOLUTION, ORAL ORAL
Status: DISCONTINUED | OUTPATIENT
Start: 2021-02-16 | End: 2021-02-16 | Stop reason: HOSPADM

## 2021-02-16 RX ORDER — HALOPERIDOL 5 MG/ML
1 INJECTION INTRAMUSCULAR
Status: DISCONTINUED | OUTPATIENT
Start: 2021-02-16 | End: 2021-02-16 | Stop reason: HOSPADM

## 2021-02-16 RX ORDER — OXYCODONE HCL 5 MG/5 ML
5 SOLUTION, ORAL ORAL
Status: DISCONTINUED | OUTPATIENT
Start: 2021-02-16 | End: 2021-02-16 | Stop reason: HOSPADM

## 2021-02-16 RX ORDER — OXYBUTYNIN CHLORIDE 10 MG/1
10 TABLET, EXTENDED RELEASE ORAL
Qty: 21 TABLET | Refills: 1 | Status: SHIPPED | OUTPATIENT
Start: 2021-02-16 | End: 2021-11-19

## 2021-02-16 RX ADMIN — ACETAMINOPHEN 1000 MG: 500 TABLET ORAL at 11:24

## 2021-02-16 RX ADMIN — ONDANSETRON 4 MG: 2 INJECTION INTRAMUSCULAR; INTRAVENOUS at 13:34

## 2021-02-16 RX ADMIN — EPHEDRINE SULFATE 15 MG: 50 INJECTION INTRAMUSCULAR; INTRAVENOUS; SUBCUTANEOUS at 13:12

## 2021-02-16 RX ADMIN — DEXAMETHASONE SODIUM PHOSPHATE 8 MG: 4 INJECTION, SOLUTION INTRAMUSCULAR; INTRAVENOUS at 13:02

## 2021-02-16 RX ADMIN — SODIUM CHLORIDE, POTASSIUM CHLORIDE, SODIUM LACTATE AND CALCIUM CHLORIDE: 600; 310; 30; 20 INJECTION, SOLUTION INTRAVENOUS at 11:31

## 2021-02-16 RX ADMIN — LIDOCAINE HYDROCHLORIDE 0.5 ML: 10 INJECTION, SOLUTION INFILTRATION; PERINEURAL at 11:31

## 2021-02-16 RX ADMIN — EPHEDRINE SULFATE 15 MG: 50 INJECTION INTRAMUSCULAR; INTRAVENOUS; SUBCUTANEOUS at 13:31

## 2021-02-16 RX ADMIN — FENTANYL CITRATE 50 MCG: 50 INJECTION, SOLUTION INTRAMUSCULAR; INTRAVENOUS at 13:26

## 2021-02-16 RX ADMIN — POVIDONE IODINE 15 ML: 100 SOLUTION TOPICAL at 11:32

## 2021-02-16 RX ADMIN — LIDOCAINE HYDROCHLORIDE 60 MG: 20 INJECTION, SOLUTION EPIDURAL; INFILTRATION; INTRACAUDAL; PERINEURAL at 12:58

## 2021-02-16 RX ADMIN — FENTANYL CITRATE 50 MCG: 50 INJECTION, SOLUTION INTRAMUSCULAR; INTRAVENOUS at 12:58

## 2021-02-16 RX ADMIN — PROPOFOL 120 MG: 10 INJECTION, EMULSION INTRAVENOUS at 12:58

## 2021-02-16 RX ADMIN — CEFAZOLIN 2 G: 1 INJECTION, POWDER, FOR SOLUTION INTRAVENOUS at 12:58

## 2021-02-16 ASSESSMENT — PAIN SCALES - GENERAL: PAIN_LEVEL: 0

## 2021-02-16 ASSESSMENT — FIBROSIS 4 INDEX: FIB4 SCORE: 1.56

## 2021-02-16 NOTE — OP REPORT
Urologic Surgery Operative Report     Date of Procedure: 2/16/2021    Pre-op Diagnosis: 1. Left ureteral obstruction vs mass  2. Left  hydronephrosis  3. Left  renal colic   Post-op Diagnosis: 1. Left ureteral mass   Procedure: 1. Cystoscopy, Left Ureteroscopy  2. Left retrograde pyelogram: w interpretation  3. Left ureteral mass biopsy   Surgeon: Surgeon(s) and Role:     * Mango Gardner M.D. - Primary   Assistant: Circulator: Joya Chen R.N.; Oli Recinos  Scrub Person: Jaqui Tellez; Jacinta Farley   Anesthesia: General,   Anesthesiologist: Gustavo Eugene M.D.   Estimated Blood Loss: minimal   IV fluids <1L Crystalloid    Specimens: 1. Left Stone for chemical analysis    Complications: None   Condition: Stable, procedure well tolerated    Drains: 1. none  2. No collazo   Disposition:  1. PACU, discharge after voiding  2. f/u next week for discussion    Findings 1. Cystoscopy revealed normal bladder mucosa, orthotopic ureteral orifices, and blood at the left UO. No bladder masses.   2. Left ureteral mass seen on ureteroscope, obliterating Ureter, high grade, biopsy taken  3. Left retrograde with interpretation - filling defect distal left ureter, goblet sign, no contrast proximal to obstruction/mass      Indication:   76yo F with Left hydronephrosis and left hydroureter down into the pelvis, unclear if benign etiology or mass. She has 8% function based on NM renal scan performed.  After a full discussion of alternatives, risks, and benefits the patient consented to proceeding with Ureteroscopy, laser lithotripsy and possible JJ stent placement on the respective side.  She understands the risk of inability to access ureter, the need for second procedures, the possibility of negative ureteroscopy, that she may have stent discomfort until this is removed, bleeding, infection, ureteral injury or stricture, bladder injury, post op urinary retention requiring collazo catheter, and the general pulmonary and  cardiovascular risks associated with anesthesia.     Procedure Details:   The patient was taken to operating room and placed on table in supine position.  Ancef was administered prior to the start of the procedure based on previous urine cultures. Sequential compression devices were placed for deep venous thrombosis prophylaxis. After induction of general anesthesia, both legs were placed in Kenrick stirrups in the standard lithotomy position.  A timeout was held confirming the correct patient, procedure and laterality.   The perineal area was prepped and draped in a sterile fashion.     A 22 Greek rigid cystoscope was inserted into the urethra and the bladder was emptied and then distended with sterile saline.   Cystoscopy revealed normal bladder mucosa, orthotopic ureteral orifices, and blood at the left UO. No bladder masses.     6fr open ended catheter was placed into the left UO and retrograde was performed, this showed goblet sign and large filling defect in the distal left ureter ~4cm from UO, concerning for mass, unable to place sensor tip wire past the mass.         IF RIGID USED: No wire was able to be used, and a semirigid ureteroscope was placed into the urethra and passed up the left ureter until the left ureteral mass was visualized in the distal left ureter. I was unable to pass wire retrograde through scope. Zero tip basket was unsuccessful, but kaiden tipped basket was able to get a good sized biopsy. This was handed off for permanent pathology. The mass was circumferential and high grade appearing, unable to find the lumen and unable to place stent.     The cystoscope sheath was placed into the bladder and emptied the bladder.  The patient was awoken from anesthesia and brought to recovery in satisfactory condition.        Mango Gardner M.D.   [id number] [place] [id number]   [phone]

## 2021-02-16 NOTE — OR NURSING
1344 To PACU from OR via gurney s/p cystoscopy. Pt drowsy, respirations spontaneous and non-labored. Per OR RN no stent placed. Pt has no complaints.    1355 Report to ESTEFANIA Horvath.

## 2021-02-16 NOTE — DISCHARGE INSTR - OTHER INFO
DR. Gardner DISCHARGE INSTRUCTIONS FOLLOWING   URETEROSCOPY with biopsy     We found a tumor in the left ureter and could not get a stent placed. We will discuss at your next appointment.     DIET:  You can resume your regular diet. We encourage you to eat well-balanced and nutritious meals.      ACTIVITY:  Please restrain from strenuous activity or heavy lifting (more than 20 pounds) for the next week.  Please walk daily as much as tolerated, making exercise a part of your daily life. Do not drive while using narcotics for pain control. You may resumes showering and bathing without any restrictions.     WOUND CARE:  1. You have no dressing or open wounds to manage.   2. You have no internal stent which means you do not need follow up for removal which is great news!    MEDICATIONS:  1. Please use an over the counter antiinflammatory (ie Ibuprofen, naproxen, Ketoralac) and/or Tylenol for pain control as needed.  2. You may take 3 days of pyridium (over the counter) as needed, for numbing the bladder and burning with urination.  3a. You have been prescribed oxybutynin prn to help with bladder spasms or burning with urination. Please hold this if having difficulty urinating however.   No narcotic prescribed    4. If you are using aspirin, Plavix, or coumadin, please don’t restart these medications until two days after your discharge if you are not having large amounts of blood in the urine.    FOLLOW-UP:  Will need to discuss future management of ureteral tumor at follow up next week.     WARNING SIGNS:  Fever greater than 101 degrees Fahrenheit, chills, nausea or vomiting, Large amount of clots in urine that make it difficult to urinate or for urine to drain from collazo, increasing pain, or abdominal swelling. If you are experiencing these symptoms, call the Urology Clinic or go to your local PCP or emergency room.    It is normal to see blood in your urine for up to 2 weeks even from surgery. The urine may clear up  entirely, and then turn bloody again a few days later depending on your activity level; do not be alarmed. However, if you experience severe pain or tenderness, have a lot of increased bleeding, or find that you are unable to urinate because of large clots, please notify your doctor immediately           Mango Gardner M.D.   [address] [address]   [phone]

## 2021-02-16 NOTE — OR NURSING
1400 REPORT RECEIVED FROM RENEE MAC. VSS 1411 DR. ELIZONDO AT BEDSIDE WITH DISCUSSION OF FINDINGS.  1430 Pt states she has no pain.    1453 Pt met criteria for transfer to phase II

## 2021-02-16 NOTE — ANESTHESIA PREPROCEDURE EVALUATION
Relevant Problems   No relevant active problems   hydronephrosis  HTN    Physical Exam    Airway   Mallampati: II  TM distance: >3 FB  Neck ROM: full       Cardiovascular - normal exam  Rhythm: regular  Rate: normal  (-) murmur     Dental - normal exam           Pulmonary - normal exam  Breath sounds clear to auscultation     Abdominal    Neurological - normal exam                 Anesthesia Plan    ASA 2       Plan - general       Airway plan will be LMA          Induction: intravenous    Postoperative Plan: Postoperative administration of opioids is intended.    Pertinent diagnostic labs and testing reviewed    Informed Consent:    Anesthetic plan and risks discussed with patient.

## 2021-02-16 NOTE — DISCHARGE INSTRUCTIONS
ACTIVITY: Rest and take it easy for the first 24 hours.  A responsible adult is recommended to remain with you during that time.  It is normal to feel sleepy.  We encourage you to not do anything that requires balance, judgment or coordination.    MILD FLU-LIKE SYMPTOMS ARE NORMAL. YOU MAY EXPERIENCE GENERALIZED MUSCLE ACHES, THROAT IRRITATION, HEADACHE AND/OR SOME NAUSEA.    FOR 24 HOURS DO NOT:  Drive, operate machinery or run household appliances.  Drink beer or alcoholic beverages.   Make important decisions or sign legal documents.    SPECIAL INSTRUCTIONS:   Activity as tolerated.     DIET: To avoid nausea, slowly advance diet as tolerated, avoiding spicy or greasy foods for the first day.  Add more substantial food to your diet according to your physician's instructions.  Babies can be fed formula or breast milk as soon as they are hungry.  INCREASE FLUIDS AND FIBER TO AVOID CONSTIPATION.        FOLLOW-UP APPOINTMENT:  A follow-up appointment should be arranged with your doctor; call to schedule.    You should CALL YOUR PHYSICIAN if you develop:  Fever greater than 101 degrees F.  Pain not relieved by medication, or persistent nausea or vomiting.  Excessive bleeding (blood soaking through dressing) or unexpected drainage from the wound.  Extreme redness or swelling around the incision site, drainage of pus or foul smelling drainage.  Inability to urinate or empty your bladder within 8 hours.  Problems with breathing or chest pain.    You should call 911 if you develop problems with breathing or chest pain.  If you are unable to contact your doctor or surgical center, you should go to the nearest emergency room or urgent care center.      Physician's telephone #: Dr. Gardner [phone]    If any questions arise, call your doctor.  If your doctor is not available, please feel free to call the Surgical Center at [phone]. The Contact Center is open Monday through Friday 7AM to 5PM and may speak to a  nurse at [phone], or toll free at [phone].     A registered nurse may call you a few days after your surgery to see how you are doing after your procedure.    MEDICATIONS: Resume taking daily medication.  Take prescribed pain medication with food.  If no medication is prescribed, you may take non-aspirin pain medication if needed.  PAIN MEDICATION CAN BE VERY CONSTIPATING.  Take a stool softener or laxative such as senokot, pericolace, or milk of magnesia if needed.    Prescription given PRE OPERATIVELY.   NO ORAL PAIN MEDICATION GIVEN IN RECOVERY.     If your physician has prescribed pain medication that includes Acetaminophen (Tylenol), do not take additional Acetaminophen (Tylenol) while taking the prescribed medication.    Depression / Suicide Risk    As you are discharged from this [facility] facility, it is important to learn how to keep safe from harming yourself.    Recognize the warning signs:  · Abrupt changes in personality, positive or negative- including increase in energy   · Giving away possessions  · Change in eating patterns- significant weight changes-  positive or negative  · Change in sleeping patterns- unable to sleep or sleeping all the time   · Unwillingness or inability to communicate  · Depression  · Unusual sadness, discouragement and loneliness  · Talk of wanting to die  · Neglect of personal appearance   · Rebelliousness- reckless behavior  · Withdrawal from people/activities they love  · Confusion- inability to concentrate     If you or a loved one observes any of these behaviors or has concerns about self-harm, here's what you can do:  · Talk about it- your feelings and reasons for harming yourself  · Remove any means that you might use to hurt yourself (examples: pills, rope, extension cords, firearm)  · Get professional help from the community (Mental Health, Substance Abuse, psychological counseling)  · Do not be alone:Call your Safe Contact- someone whom you trust  who will be there for you.  · Call your local CRISIS HOTLINE 639-1383 or 795-469-1366  · Call your local Children's Mobile Crisis Response Team [place] (222) 422-5250 or www.[facility]  · Call the toll free National Suicide Prevention Hotlines   · National Suicide Prevention Lifeline 067-631-FFLH (8393)  · National [employer] User [place] Line Network 800-SUICIDE (100-3215)

## 2021-02-16 NOTE — OR NURSING
1453: To stage ll bathroom. Able to void unknown amt. Pt dressed and walked to chair. No pain or nausea. Awaiting ride.  1548: Home care instructions reviewed w/ pt and . Questions, concerns addressed. Meets criteria for discharge.

## 2021-02-16 NOTE — ANESTHESIA PROCEDURE NOTES
Airway    Date/Time: 2/16/2021 12:59 PM  Performed by: Gustavo Eugene M.D.  Authorized by: Gustavo Eugene M.D.     Location:  OR  Urgency:  Elective  Indications for Airway Management:  Anesthesia      Spontaneous Ventilation: absent    Sedation Level:  Deep  Preoxygenated: Yes    Final Airway Type:  Supraglottic airway  Final Supraglottic Airway:  Standard LMA    SGA Size:  3  Number of Attempts at Approach:  1

## 2021-02-16 NOTE — ANESTHESIA POSTPROCEDURE EVALUATION
Patient: Zo Mayo Oppio    Procedure Summary     Date: 02/16/21 Room / Location:  OR 03 / SURGERY [facility]    Anesthesia Start: 1251 Anesthesia Stop: 1346    Procedures:       CYSTOSCOPY, WITH LEFT RETROGRADE URETEROPYELOGRAM (Left Urethra)      LEFT URETEROSCOPY - WITH LEFT URETERAL MASS BIOPSY (Left Ureter) Diagnosis: (HYDRONEPHROSIS)    Surgeons: Mango Gardner M.D. Responsible Provider: Gustavo Eugene M.D.    Anesthesia Type: general ASA Status: 2          Final Anesthesia Type: general  Last vitals  BP   Blood Pressure : 115/53    Temp   36.5 °C (97.7 °F)    Pulse   68   Resp   14    SpO2   100 %      Anesthesia Post Evaluation    Patient location during evaluation: PACU  Patient participation: complete - patient participated  Level of consciousness: awake and alert  Pain score: 0    Airway patency: patent  Anesthetic complications: no  Cardiovascular status: hemodynamically stable  Respiratory status: acceptable  Hydration status: euvolemic    PONV: none          No complications documented.     Nurse Pain Score: 0 (NPRS)

## 2021-02-26 ENCOUNTER — HOSPITAL ENCOUNTER (OUTPATIENT)
Dept: RADIOLOGY | Facility: MEDICAL CENTER | Age: 76
End: 2021-02-26
Attending: UROLOGY
Payer: MEDICARE

## 2021-02-26 VITALS — WEIGHT: 149.91 LBS | BODY MASS INDEX: 27.42 KG/M2

## 2021-02-26 DIAGNOSIS — C66.9 MALIGNANT NEOPLASM OF URETER, UNSPECIFIED LATERALITY (HCC): ICD-10-CM

## 2021-02-26 PROCEDURE — 74183 MRI ABD W/O CNTR FLWD CNTR: CPT | Mod: MH

## 2021-02-26 PROCEDURE — 72197 MRI PELVIS W/O & W/DYE: CPT | Mod: MH

## 2021-02-26 PROCEDURE — 700117 HCHG RX CONTRAST REV CODE 255: Performed by: UROLOGY

## 2021-02-26 PROCEDURE — 71046 X-RAY EXAM CHEST 2 VIEWS: CPT

## 2021-02-26 PROCEDURE — A9576 INJ PROHANCE MULTIPACK: HCPCS | Performed by: UROLOGY

## 2021-02-26 PROCEDURE — 700105 HCHG RX REV CODE 258: Performed by: RADIOLOGY

## 2021-02-26 RX ORDER — FUROSEMIDE 10 MG/ML
20 INJECTION INTRAMUSCULAR; INTRAVENOUS
Status: DISCONTINUED | OUTPATIENT
Start: 2021-02-26 | End: 2021-02-27 | Stop reason: HOSPADM

## 2021-02-26 RX ORDER — SODIUM CHLORIDE 9 MG/ML
680 INJECTION, SOLUTION INTRAVENOUS ONCE
Status: COMPLETED | OUTPATIENT
Start: 2021-02-26 | End: 2021-02-26

## 2021-02-26 RX ADMIN — GADOTERIDOL 15 ML: 279.3 INJECTION, SOLUTION INTRAVENOUS at 18:43

## 2021-02-26 RX ADMIN — SODIUM CHLORIDE 680 ML: 9 INJECTION, SOLUTION INTRAVENOUS at 16:45

## 2021-02-26 ASSESSMENT — FIBROSIS 4 INDEX: FIB4 SCORE: 1.56

## 2021-02-26 NOTE — PROGRESS NOTES
"MRI NURSING NOTES:    Pt is scheduled for MRI urogram today.  Pt's 2.10.2021 creatinine is 1.6 & seems to be climbing.    Discussed c Dr Bruce regarding fluids and lasix needed for MRI.  No new order(s) received c MD   Stating, \"that's fine.\"  "

## 2021-03-11 PROCEDURE — 86901 BLOOD TYPING SEROLOGIC RH(D): CPT

## 2021-03-11 PROCEDURE — 86900 BLOOD TYPING SEROLOGIC ABO: CPT

## 2021-03-15 ENCOUNTER — HOSPITAL ENCOUNTER (OUTPATIENT)
Facility: MEDICAL CENTER | Age: 76
End: 2021-03-15
Attending: UROLOGY
Payer: MEDICARE

## 2021-03-15 PROCEDURE — 85610 PROTHROMBIN TIME: CPT

## 2021-03-15 PROCEDURE — 87086 URINE CULTURE/COLONY COUNT: CPT

## 2021-03-15 PROCEDURE — 85730 THROMBOPLASTIN TIME PARTIAL: CPT

## 2021-03-15 PROCEDURE — 86901 BLOOD TYPING SEROLOGIC RH(D): CPT

## 2021-03-15 PROCEDURE — 80048 BASIC METABOLIC PNL TOTAL CA: CPT

## 2021-03-15 PROCEDURE — 86900 BLOOD TYPING SEROLOGIC ABO: CPT

## 2021-03-16 LAB
ABO GROUP BLD: NORMAL
ANION GAP SERPL CALC-SCNC: 10 MMOL/L (ref 7–16)
APTT PPP: 26.6 SEC (ref 24.7–36)
BUN SERPL-MCNC: 22 MG/DL (ref 8–22)
CALCIUM SERPL-MCNC: 9.7 MG/DL (ref 8.5–10.5)
CHLORIDE SERPL-SCNC: 103 MMOL/L (ref 96–112)
CO2 SERPL-SCNC: 22 MMOL/L (ref 20–33)
CREAT SERPL-MCNC: 1.44 MG/DL (ref 0.5–1.4)
GLUCOSE SERPL-MCNC: 144 MG/DL (ref 65–99)
INR PPP: 1.05 (ref 0.87–1.13)
POTASSIUM SERPL-SCNC: 4.2 MMOL/L (ref 3.6–5.5)
PROTHROMBIN TIME: 14.1 SEC (ref 12–14.6)
RH BLD: NORMAL
SODIUM SERPL-SCNC: 135 MMOL/L (ref 135–145)

## 2021-03-17 ENCOUNTER — HOSPITAL ENCOUNTER (OUTPATIENT)
Facility: MEDICAL CENTER | Age: 76
End: 2021-03-17
Attending: UROLOGY
Payer: MEDICARE

## 2021-03-17 PROCEDURE — 85025 COMPLETE CBC W/AUTO DIFF WBC: CPT

## 2021-03-17 PROCEDURE — 87086 URINE CULTURE/COLONY COUNT: CPT

## 2021-03-18 LAB
BASOPHILS # BLD AUTO: 0.9 % (ref 0–1.8)
BASOPHILS # BLD: 0.03 K/UL (ref 0–0.12)
EOSINOPHIL # BLD AUTO: 0.13 K/UL (ref 0–0.51)
EOSINOPHIL NFR BLD: 3.8 % (ref 0–6.9)
ERYTHROCYTE [DISTWIDTH] IN BLOOD BY AUTOMATED COUNT: 48.6 FL (ref 35.9–50)
HCT VFR BLD AUTO: 43.5 % (ref 37–47)
HGB BLD-MCNC: 13.4 G/DL (ref 12–16)
IMM GRANULOCYTES # BLD AUTO: 0.01 K/UL (ref 0–0.11)
IMM GRANULOCYTES NFR BLD AUTO: 0.3 % (ref 0–0.9)
LYMPHOCYTES # BLD AUTO: 0.91 K/UL (ref 1–4.8)
LYMPHOCYTES NFR BLD: 26.5 % (ref 22–41)
MCH RBC QN AUTO: 30.9 PG (ref 27–33)
MCHC RBC AUTO-ENTMCNC: 30.8 G/DL (ref 33.6–35)
MCV RBC AUTO: 100.5 FL (ref 81.4–97.8)
MONOCYTES # BLD AUTO: 0.27 K/UL (ref 0–0.85)
MONOCYTES NFR BLD AUTO: 7.8 % (ref 0–13.4)
NEUTROPHILS # BLD AUTO: 2.09 K/UL (ref 2–7.15)
NEUTROPHILS NFR BLD: 60.7 % (ref 44–72)
NRBC # BLD AUTO: 0 K/UL
NRBC BLD-RTO: 0 /100 WBC
PLATELET # BLD AUTO: 198 K/UL (ref 164–446)
PMV BLD AUTO: 10.9 FL (ref 9–12.9)
RBC # BLD AUTO: 4.33 M/UL (ref 4.2–5.4)
WBC # BLD AUTO: 3.4 K/UL (ref 4.8–10.8)

## 2021-03-19 LAB
BACTERIA UR CULT: NORMAL
SIGNIFICANT IND 70042: NORMAL
SITE SITE: NORMAL
SOURCE SOURCE: NORMAL

## 2021-03-20 LAB
BACTERIA UR CULT: NORMAL
SIGNIFICANT IND 70042: NORMAL
SITE SITE: NORMAL
SOURCE SOURCE: NORMAL

## 2021-03-26 ENCOUNTER — PRE-ADMISSION TESTING (OUTPATIENT)
Dept: ADMISSIONS | Facility: MEDICAL CENTER | Age: 76
DRG: 657 | End: 2021-03-26
Attending: UROLOGY
Payer: MEDICARE

## 2021-03-26 DIAGNOSIS — Z01.812 PRE-OPERATIVE LABORATORY EXAMINATION: ICD-10-CM

## 2021-03-26 LAB
APPEARANCE UR: CLEAR
BILIRUB UR QL STRIP.AUTO: NEGATIVE
COLOR UR: YELLOW
GLUCOSE UR STRIP.AUTO-MCNC: NEGATIVE MG/DL
KETONES UR STRIP.AUTO-MCNC: NEGATIVE MG/DL
LEUKOCYTE ESTERASE UR QL STRIP.AUTO: NEGATIVE
MICRO URNS: NORMAL
NITRITE UR QL STRIP.AUTO: NEGATIVE
PH UR STRIP.AUTO: 6 [PH] (ref 5–8)
PROT UR QL STRIP: NEGATIVE MG/DL
RBC UR QL AUTO: NEGATIVE
SARS-COV-2 RNA RESP QL NAA+PROBE: NOTDETECTED
SP GR UR STRIP.AUTO: 1.01
SPECIMEN SOURCE: NORMAL
UROBILINOGEN UR STRIP.AUTO-MCNC: 0.2 MG/DL

## 2021-03-26 PROCEDURE — 81003 URINALYSIS AUTO W/O SCOPE: CPT

## 2021-03-26 PROCEDURE — U0003 INFECTIOUS AGENT DETECTION BY NUCLEIC ACID (DNA OR RNA); SEVERE ACUTE RESPIRATORY SYNDROME CORONAVIRUS 2 (SARS-COV-2) (CORONAVIRUS DISEASE [COVID-19]), AMPLIFIED PROBE TECHNIQUE, MAKING USE OF HIGH THROUGHPUT TECHNOLOGIES AS DESCRIBED BY CMS-2020-01-R: HCPCS

## 2021-03-26 PROCEDURE — C9803 HOPD COVID-19 SPEC COLLECT: HCPCS

## 2021-03-26 PROCEDURE — U0005 INFEC AGEN DETEC AMPLI PROBE: HCPCS

## 2021-03-26 ASSESSMENT — FIBROSIS 4 INDEX: FIB4 SCORE: 1.78

## 2021-03-26 NOTE — OR NURSING
RN verified with blood bank that pt does not need a COD done today, and just on the DOS since she already had ABO done on 03/11/21.

## 2021-03-26 NOTE — OR NURSING
RN called Dr. Gardner's office to verify if pt needs to have UA done. It appears that all labs that MD ordered were done in office between 3/11-3/17, but a UA was not done. Waiting for call back from office.

## 2021-03-30 ENCOUNTER — ANESTHESIA EVENT (OUTPATIENT)
Dept: SURGERY | Facility: MEDICAL CENTER | Age: 76
DRG: 657 | End: 2021-03-30
Payer: MEDICARE

## 2021-03-30 ENCOUNTER — HOSPITAL ENCOUNTER (INPATIENT)
Facility: MEDICAL CENTER | Age: 76
LOS: 1 days | DRG: 657 | End: 2021-03-31
Attending: UROLOGY | Admitting: UROLOGY
Payer: MEDICARE

## 2021-03-30 ENCOUNTER — ANESTHESIA (OUTPATIENT)
Dept: SURGERY | Facility: MEDICAL CENTER | Age: 76
DRG: 657 | End: 2021-03-30
Payer: MEDICARE

## 2021-03-30 LAB
ABO GROUP BLD: NORMAL
BLD GP AB SCN SERPL QL: NORMAL
PATHOLOGY CONSULT NOTE: NORMAL
RH BLD: NORMAL

## 2021-03-30 PROCEDURE — 501450 HCHG STAPLES, ENDO MULTIFIRE: Performed by: UROLOGY

## 2021-03-30 PROCEDURE — 160009 HCHG ANES TIME/MIN: Performed by: UROLOGY

## 2021-03-30 PROCEDURE — 502714 HCHG ROBOTIC SURGERY SERVICES: Performed by: UROLOGY

## 2021-03-30 PROCEDURE — 700105 HCHG RX REV CODE 258: Performed by: UROLOGY

## 2021-03-30 PROCEDURE — 501838 HCHG SUTURE GENERAL: Performed by: UROLOGY

## 2021-03-30 PROCEDURE — 700101 HCHG RX REV CODE 250: Performed by: UROLOGY

## 2021-03-30 PROCEDURE — A9270 NON-COVERED ITEM OR SERVICE: HCPCS | Performed by: UROLOGY

## 2021-03-30 PROCEDURE — 700102 HCHG RX REV CODE 250 W/ 637 OVERRIDE(OP): Performed by: UROLOGY

## 2021-03-30 PROCEDURE — 86901 BLOOD TYPING SEROLOGIC RH(D): CPT

## 2021-03-30 PROCEDURE — 500514 HCHG ENDOCLIP: Performed by: UROLOGY

## 2021-03-30 PROCEDURE — 700101 HCHG RX REV CODE 250: Performed by: ANESTHESIOLOGY

## 2021-03-30 PROCEDURE — 500376 HCHG DRAIN, J-P ROUND 15FR: Performed by: UROLOGY

## 2021-03-30 PROCEDURE — C1758 CATHETER, URETERAL: HCPCS | Performed by: UROLOGY

## 2021-03-30 PROCEDURE — 501571 HCHG TROCAR, SEPARATOR 12X100: Performed by: UROLOGY

## 2021-03-30 PROCEDURE — 160035 HCHG PACU - 1ST 60 MINS PHASE I: Performed by: UROLOGY

## 2021-03-30 PROCEDURE — 160036 HCHG PACU - EA ADDL 30 MINS PHASE I: Performed by: UROLOGY

## 2021-03-30 PROCEDURE — 0TT74ZZ RESECTION OF LEFT URETER, PERCUTANEOUS ENDOSCOPIC APPROACH: ICD-10-PCS | Performed by: UROLOGY

## 2021-03-30 PROCEDURE — 700111 HCHG RX REV CODE 636 W/ 250 OVERRIDE (IP): Performed by: ANESTHESIOLOGY

## 2021-03-30 PROCEDURE — 700102 HCHG RX REV CODE 250 W/ 637 OVERRIDE(OP)

## 2021-03-30 PROCEDURE — 86900 BLOOD TYPING SEROLOGIC ABO: CPT

## 2021-03-30 PROCEDURE — 700111 HCHG RX REV CODE 636 W/ 250 OVERRIDE (IP): Performed by: UROLOGY

## 2021-03-30 PROCEDURE — 86850 RBC ANTIBODY SCREEN: CPT

## 2021-03-30 PROCEDURE — A4357 BEDSIDE DRAINAGE BAG: HCPCS | Performed by: UROLOGY

## 2021-03-30 PROCEDURE — 501570 HCHG TROCAR, SEPARATOR: Performed by: UROLOGY

## 2021-03-30 PROCEDURE — 88307 TISSUE EXAM BY PATHOLOGIST: CPT | Mod: 59

## 2021-03-30 PROCEDURE — 160002 HCHG RECOVERY MINUTES (STAT): Performed by: UROLOGY

## 2021-03-30 PROCEDURE — 501399 HCHG SPECIMAN BAG, ENDO CATC: Performed by: UROLOGY

## 2021-03-30 PROCEDURE — C1769 GUIDE WIRE: HCPCS | Performed by: UROLOGY

## 2021-03-30 PROCEDURE — A9270 NON-COVERED ITEM OR SERVICE: HCPCS

## 2021-03-30 PROCEDURE — 500868 HCHG NEEDLE, SURGI(VARES): Performed by: UROLOGY

## 2021-03-30 PROCEDURE — 501576 HCHG TROCAR, SMTH CAN&SEAL12: Performed by: UROLOGY

## 2021-03-30 PROCEDURE — 770001 HCHG ROOM/CARE - MED/SURG/GYN PRIV*

## 2021-03-30 PROCEDURE — 160042 HCHG SURGERY MINUTES - EA ADDL 1 MIN LEVEL 5: Performed by: UROLOGY

## 2021-03-30 PROCEDURE — 160031 HCHG SURGERY MINUTES - 1ST 30 MINS LEVEL 5: Performed by: UROLOGY

## 2021-03-30 PROCEDURE — 0TT14ZZ RESECTION OF LEFT KIDNEY, PERCUTANEOUS ENDOSCOPIC APPROACH: ICD-10-PCS | Performed by: UROLOGY

## 2021-03-30 PROCEDURE — 501329 HCHG SET, CYSTO IRRIG Y TUR: Performed by: UROLOGY

## 2021-03-30 PROCEDURE — 07TC4ZZ RESECTION OF PELVIS LYMPHATIC, PERCUTANEOUS ENDOSCOPIC APPROACH: ICD-10-PCS | Performed by: UROLOGY

## 2021-03-30 PROCEDURE — 501338 HCHG SHEARS, ENDO: Performed by: UROLOGY

## 2021-03-30 PROCEDURE — 160048 HCHG OR STATISTICAL LEVEL 1-5: Performed by: UROLOGY

## 2021-03-30 PROCEDURE — 64486 TAP BLOCK UNIL BY INJECTION: CPT | Performed by: UROLOGY

## 2021-03-30 PROCEDURE — 8E0W4CZ ROBOTIC ASSISTED PROCEDURE OF TRUNK REGION, PERCUTANEOUS ENDOSCOPIC APPROACH: ICD-10-PCS | Performed by: UROLOGY

## 2021-03-30 PROCEDURE — A6402 STERILE GAUZE <= 16 SQ IN: HCPCS | Performed by: UROLOGY

## 2021-03-30 RX ORDER — DIPHENHYDRAMINE HYDROCHLORIDE 50 MG/ML
12.5 INJECTION INTRAMUSCULAR; INTRAVENOUS
Status: DISCONTINUED | OUTPATIENT
Start: 2021-03-30 | End: 2021-03-30 | Stop reason: HOSPADM

## 2021-03-30 RX ORDER — LIDOCAINE HYDROCHLORIDE 20 MG/ML
INJECTION, SOLUTION EPIDURAL; INFILTRATION; INTRACAUDAL; PERINEURAL PRN
Status: DISCONTINUED | OUTPATIENT
Start: 2021-03-30 | End: 2021-03-30 | Stop reason: SURG

## 2021-03-30 RX ORDER — SODIUM CHLORIDE, SODIUM LACTATE, POTASSIUM CHLORIDE, CALCIUM CHLORIDE 600; 310; 30; 20 MG/100ML; MG/100ML; MG/100ML; MG/100ML
INJECTION, SOLUTION INTRAVENOUS CONTINUOUS
Status: DISCONTINUED | OUTPATIENT
Start: 2021-03-30 | End: 2021-03-30 | Stop reason: HOSPADM

## 2021-03-30 RX ORDER — ACETAMINOPHEN 500 MG
500 TABLET ORAL EVERY 6 HOURS PRN
COMMUNITY

## 2021-03-30 RX ORDER — POLYETHYLENE GLYCOL 3350 17 G/17G
1 POWDER, FOR SOLUTION ORAL DAILY
Status: DISCONTINUED | OUTPATIENT
Start: 2021-03-30 | End: 2021-03-31 | Stop reason: HOSPADM

## 2021-03-30 RX ORDER — OXYCODONE HYDROCHLORIDE 5 MG/1
2.5 TABLET ORAL
Status: DISCONTINUED | OUTPATIENT
Start: 2021-03-30 | End: 2021-03-31 | Stop reason: HOSPADM

## 2021-03-30 RX ORDER — HYDROMORPHONE HYDROCHLORIDE 1 MG/ML
0.25 INJECTION, SOLUTION INTRAMUSCULAR; INTRAVENOUS; SUBCUTANEOUS
Status: DISCONTINUED | OUTPATIENT
Start: 2021-03-30 | End: 2021-03-31 | Stop reason: HOSPADM

## 2021-03-30 RX ORDER — MORPHINE SULFATE 4 MG/ML
2 INJECTION, SOLUTION INTRAMUSCULAR; INTRAVENOUS
Status: DISCONTINUED | OUTPATIENT
Start: 2021-03-30 | End: 2021-03-30 | Stop reason: HOSPADM

## 2021-03-30 RX ORDER — DIPHENHYDRAMINE HYDROCHLORIDE 50 MG/ML
25 INJECTION INTRAMUSCULAR; INTRAVENOUS EVERY 6 HOURS PRN
Status: DISCONTINUED | OUTPATIENT
Start: 2021-03-30 | End: 2021-03-31 | Stop reason: HOSPADM

## 2021-03-30 RX ORDER — DEXAMETHASONE SODIUM PHOSPHATE 4 MG/ML
INJECTION, SOLUTION INTRA-ARTICULAR; INTRALESIONAL; INTRAMUSCULAR; INTRAVENOUS; SOFT TISSUE PRN
Status: DISCONTINUED | OUTPATIENT
Start: 2021-03-30 | End: 2021-03-30 | Stop reason: SURG

## 2021-03-30 RX ORDER — DEXAMETHASONE SODIUM PHOSPHATE 4 MG/ML
4 INJECTION, SOLUTION INTRA-ARTICULAR; INTRALESIONAL; INTRAMUSCULAR; INTRAVENOUS; SOFT TISSUE
Status: DISCONTINUED | OUTPATIENT
Start: 2021-03-30 | End: 2021-03-31 | Stop reason: HOSPADM

## 2021-03-30 RX ORDER — ACETAMINOPHEN/DIPHENHYDRAMINE 500MG-25MG
1 TABLET ORAL
COMMUNITY

## 2021-03-30 RX ORDER — MORPHINE SULFATE 4 MG/ML
1 INJECTION, SOLUTION INTRAMUSCULAR; INTRAVENOUS
Status: DISCONTINUED | OUTPATIENT
Start: 2021-03-30 | End: 2021-03-30 | Stop reason: HOSPADM

## 2021-03-30 RX ORDER — ACETAMINOPHEN 500 MG
1000 TABLET ORAL EVERY 6 HOURS
Status: DISCONTINUED | OUTPATIENT
Start: 2021-03-30 | End: 2021-03-31 | Stop reason: HOSPADM

## 2021-03-30 RX ORDER — ACETAMINOPHEN 500 MG
1000 TABLET ORAL EVERY 6 HOURS PRN
Status: DISCONTINUED | OUTPATIENT
Start: 2021-04-04 | End: 2021-03-31 | Stop reason: HOSPADM

## 2021-03-30 RX ORDER — DILTIAZEM HYDROCHLORIDE 120 MG/1
120 CAPSULE, COATED, EXTENDED RELEASE ORAL EVERY EVENING
Status: DISCONTINUED | OUTPATIENT
Start: 2021-03-30 | End: 2021-03-31 | Stop reason: HOSPADM

## 2021-03-30 RX ORDER — SCOLOPAMINE TRANSDERMAL SYSTEM 1 MG/1
1 PATCH, EXTENDED RELEASE TRANSDERMAL
Status: DISCONTINUED | OUTPATIENT
Start: 2021-03-30 | End: 2021-03-31 | Stop reason: HOSPADM

## 2021-03-30 RX ORDER — ACETAMINOPHEN 500 MG
TABLET ORAL
Status: COMPLETED
Start: 2021-03-30 | End: 2021-03-30

## 2021-03-30 RX ORDER — MIDAZOLAM HYDROCHLORIDE 1 MG/ML
INJECTION INTRAMUSCULAR; INTRAVENOUS PRN
Status: DISCONTINUED | OUTPATIENT
Start: 2021-03-30 | End: 2021-03-30 | Stop reason: SURG

## 2021-03-30 RX ORDER — CEFAZOLIN SODIUM 1 G/3ML
INJECTION, POWDER, FOR SOLUTION INTRAMUSCULAR; INTRAVENOUS PRN
Status: DISCONTINUED | OUTPATIENT
Start: 2021-03-30 | End: 2021-03-30 | Stop reason: SURG

## 2021-03-30 RX ORDER — OXYCODONE HYDROCHLORIDE 5 MG/1
5 TABLET ORAL
Status: DISCONTINUED | OUTPATIENT
Start: 2021-03-30 | End: 2021-03-31 | Stop reason: HOSPADM

## 2021-03-30 RX ORDER — SODIUM CHLORIDE, SODIUM LACTATE, POTASSIUM CHLORIDE, CALCIUM CHLORIDE 600; 310; 30; 20 MG/100ML; MG/100ML; MG/100ML; MG/100ML
INJECTION, SOLUTION INTRAVENOUS CONTINUOUS
Status: ACTIVE | OUTPATIENT
Start: 2021-03-30 | End: 2021-03-31

## 2021-03-30 RX ORDER — MAGNESIUM SULFATE HEPTAHYDRATE 40 MG/ML
INJECTION, SOLUTION INTRAVENOUS PRN
Status: DISCONTINUED | OUTPATIENT
Start: 2021-03-30 | End: 2021-03-30 | Stop reason: SURG

## 2021-03-30 RX ORDER — ROCURONIUM BROMIDE 10 MG/ML
INJECTION, SOLUTION INTRAVENOUS PRN
Status: DISCONTINUED | OUTPATIENT
Start: 2021-03-30 | End: 2021-03-30 | Stop reason: SURG

## 2021-03-30 RX ORDER — BUPIVACAINE HYDROCHLORIDE AND EPINEPHRINE 5; 5 MG/ML; UG/ML
INJECTION, SOLUTION EPIDURAL; INTRACAUDAL; PERINEURAL
Status: DISCONTINUED | OUTPATIENT
Start: 2021-03-30 | End: 2021-03-30 | Stop reason: HOSPADM

## 2021-03-30 RX ORDER — SODIUM CHLORIDE, SODIUM LACTATE, POTASSIUM CHLORIDE, CALCIUM CHLORIDE 600; 310; 30; 20 MG/100ML; MG/100ML; MG/100ML; MG/100ML
INJECTION, SOLUTION INTRAVENOUS CONTINUOUS
Status: ACTIVE | OUTPATIENT
Start: 2021-03-30 | End: 2021-03-30

## 2021-03-30 RX ORDER — HALOPERIDOL 5 MG/ML
1 INJECTION INTRAMUSCULAR EVERY 6 HOURS PRN
Status: DISCONTINUED | OUTPATIENT
Start: 2021-03-30 | End: 2021-03-31 | Stop reason: HOSPADM

## 2021-03-30 RX ORDER — ONDANSETRON 2 MG/ML
INJECTION INTRAMUSCULAR; INTRAVENOUS PRN
Status: DISCONTINUED | OUTPATIENT
Start: 2021-03-30 | End: 2021-03-30 | Stop reason: SURG

## 2021-03-30 RX ORDER — CEPHALEXIN 250 MG/1
250 CAPSULE ORAL DAILY
Status: DISCONTINUED | OUTPATIENT
Start: 2021-03-30 | End: 2021-03-31 | Stop reason: HOSPADM

## 2021-03-30 RX ORDER — MIDAZOLAM HYDROCHLORIDE 1 MG/ML
1 INJECTION INTRAMUSCULAR; INTRAVENOUS
Status: DISCONTINUED | OUTPATIENT
Start: 2021-03-30 | End: 2021-03-30 | Stop reason: HOSPADM

## 2021-03-30 RX ORDER — ONDANSETRON 2 MG/ML
4 INJECTION INTRAMUSCULAR; INTRAVENOUS EVERY 4 HOURS PRN
Status: DISCONTINUED | OUTPATIENT
Start: 2021-03-30 | End: 2021-03-31 | Stop reason: HOSPADM

## 2021-03-30 RX ORDER — OXYBUTYNIN CHLORIDE 10 MG/1
10 TABLET, EXTENDED RELEASE ORAL DAILY
Status: DISCONTINUED | OUTPATIENT
Start: 2021-03-30 | End: 2021-03-31 | Stop reason: HOSPADM

## 2021-03-30 RX ORDER — ATROPA BELLADONNA AND OPIUM 16.2; 6 MG/1; MG/1
60 SUPPOSITORY RECTAL EVERY 12 HOURS PRN
Status: DISCONTINUED | OUTPATIENT
Start: 2021-03-30 | End: 2021-03-31 | Stop reason: HOSPADM

## 2021-03-30 RX ADMIN — MAGNESIUM SULFATE IN WATER 4 G: 40 INJECTION, SOLUTION INTRAVENOUS at 07:52

## 2021-03-30 RX ADMIN — MORPHINE SULFATE 1 MG: 4 INJECTION INTRAVENOUS at 11:37

## 2021-03-30 RX ADMIN — EPHEDRINE SULFATE 10 MG: 50 INJECTION, SOLUTION INTRAVENOUS at 08:13

## 2021-03-30 RX ADMIN — SODIUM CHLORIDE 1000 MG: 9 INJECTION, SOLUTION INTRAMUSCULAR; INTRAVENOUS; SUBCUTANEOUS at 08:30

## 2021-03-30 RX ADMIN — ONDANSETRON 4 MG: 2 INJECTION INTRAMUSCULAR; INTRAVENOUS at 07:42

## 2021-03-30 RX ADMIN — DEXAMETHASONE SODIUM PHOSPHATE 4 MG: 4 INJECTION, SOLUTION INTRA-ARTICULAR; INTRALESIONAL; INTRAMUSCULAR; INTRAVENOUS; SOFT TISSUE at 07:42

## 2021-03-30 RX ADMIN — SODIUM CHLORIDE, POTASSIUM CHLORIDE, SODIUM LACTATE AND CALCIUM CHLORIDE: 600; 310; 30; 20 INJECTION, SOLUTION INTRAVENOUS at 15:27

## 2021-03-30 RX ADMIN — ROCURONIUM BROMIDE 10 MG: 10 INJECTION, SOLUTION INTRAVENOUS at 09:34

## 2021-03-30 RX ADMIN — ROCURONIUM BROMIDE 30 MG: 10 INJECTION, SOLUTION INTRAVENOUS at 07:42

## 2021-03-30 RX ADMIN — FENTANYL CITRATE 100 MCG: 50 INJECTION, SOLUTION INTRAMUSCULAR; INTRAVENOUS at 07:42

## 2021-03-30 RX ADMIN — LIDOCAINE HYDROCHLORIDE 100 MG: 20 INJECTION, SOLUTION EPIDURAL; INFILTRATION; INTRACAUDAL at 07:42

## 2021-03-30 RX ADMIN — SODIUM CHLORIDE, POTASSIUM CHLORIDE, SODIUM LACTATE AND CALCIUM CHLORIDE: 600; 310; 30; 20 INJECTION, SOLUTION INTRAVENOUS at 06:39

## 2021-03-30 RX ADMIN — PROPOFOL 50 MG: 10 INJECTION, EMULSION INTRAVENOUS at 07:42

## 2021-03-30 RX ADMIN — MORPHINE SULFATE 2 MG: 4 INJECTION INTRAVENOUS at 11:51

## 2021-03-30 RX ADMIN — CEPHALEXIN 250 MG: 250 CAPSULE ORAL at 15:27

## 2021-03-30 RX ADMIN — SUGAMMADEX 200 MG: 100 INJECTION, SOLUTION INTRAVENOUS at 11:02

## 2021-03-30 RX ADMIN — ACETAMINOPHEN 1000 MG: 500 TABLET ORAL at 07:15

## 2021-03-30 RX ADMIN — MORPHINE SULFATE 1 MG: 4 INJECTION INTRAVENOUS at 12:01

## 2021-03-30 RX ADMIN — LIDOCAINE HYDROCHLORIDE 0.5 ML: 10 INJECTION, SOLUTION EPIDURAL; INFILTRATION; INTRACAUDAL at 06:39

## 2021-03-30 RX ADMIN — DILTIAZEM HYDROCHLORIDE 120 MG: 120 CAPSULE, COATED, EXTENDED RELEASE ORAL at 17:51

## 2021-03-30 RX ADMIN — ACETAMINOPHEN 1000 MG: 500 TABLET, FILM COATED ORAL at 21:07

## 2021-03-30 RX ADMIN — DEXAMETHASONE SODIUM PHOSPHATE 4 MG: 4 INJECTION, SOLUTION INTRA-ARTICULAR; INTRALESIONAL; INTRAMUSCULAR; INTRAVENOUS; SOFT TISSUE at 11:10

## 2021-03-30 RX ADMIN — MIDAZOLAM HYDROCHLORIDE 2 MG: 1 INJECTION, SOLUTION INTRAMUSCULAR; INTRAVENOUS at 07:42

## 2021-03-30 RX ADMIN — BUPIVACAINE HYDROCHLORIDE AND EPINEPHRINE BITARTRATE 10 ML: 5; .005 INJECTION, SOLUTION EPIDURAL; INTRACAUDAL; PERINEURAL at 11:10

## 2021-03-30 RX ADMIN — POVIDONE IODINE 15 ML: 100 SOLUTION TOPICAL at 06:39

## 2021-03-30 RX ADMIN — CEFAZOLIN 2 G: 330 INJECTION, POWDER, FOR SOLUTION INTRAMUSCULAR; INTRAVENOUS at 07:42

## 2021-03-30 RX ADMIN — OXYBUTYNIN CHLORIDE 10 MG: 10 TABLET, EXTENDED RELEASE ORAL at 15:27

## 2021-03-30 RX ADMIN — ACETAMINOPHEN 1000 MG: 500 TABLET, FILM COATED ORAL at 15:26

## 2021-03-30 ASSESSMENT — COGNITIVE AND FUNCTIONAL STATUS - GENERAL
DAILY ACTIVITIY SCORE: 24
MOBILITY SCORE: 24
SUGGESTED CMS G CODE MODIFIER MOBILITY: CH
SUGGESTED CMS G CODE MODIFIER DAILY ACTIVITY: CH

## 2021-03-30 ASSESSMENT — LIFESTYLE VARIABLES
EVER HAD A DRINK FIRST THING IN THE MORNING TO STEADY YOUR NERVES TO GET RID OF A HANGOVER: NO
EVER FELT BAD OR GUILTY ABOUT YOUR DRINKING: NO
TOTAL SCORE: 0
TOTAL SCORE: 0
ALCOHOL_USE: NO
ON A TYPICAL DAY WHEN YOU DRINK ALCOHOL HOW MANY DRINKS DO YOU HAVE: 0
TOTAL SCORE: 0
HOW MANY TIMES IN THE PAST YEAR HAVE YOU HAD 5 OR MORE DRINKS IN A DAY: 0
AVERAGE NUMBER OF DAYS PER WEEK YOU HAVE A DRINK CONTAINING ALCOHOL: 0
HAVE PEOPLE ANNOYED YOU BY CRITICIZING YOUR DRINKING: NO
CONSUMPTION TOTAL: NEGATIVE
HAVE YOU EVER FELT YOU SHOULD CUT DOWN ON YOUR DRINKING: NO

## 2021-03-30 ASSESSMENT — FIBROSIS 4 INDEX: FIB4 SCORE: 1.78

## 2021-03-30 ASSESSMENT — PATIENT HEALTH QUESTIONNAIRE - PHQ9
SUM OF ALL RESPONSES TO PHQ9 QUESTIONS 1 AND 2: 0
2. FEELING DOWN, DEPRESSED, IRRITABLE, OR HOPELESS: NOT AT ALL
1. LITTLE INTEREST OR PLEASURE IN DOING THINGS: NOT AT ALL

## 2021-03-30 ASSESSMENT — PAIN DESCRIPTION - PAIN TYPE
TYPE: SURGICAL PAIN

## 2021-03-30 ASSESSMENT — PAIN SCALES - GENERAL: PAIN_LEVEL: 4

## 2021-03-30 NOTE — PROGRESS NOTES
"Pharmacy Chemotherapy calculation:    Pt Name: Zo Mayo Opmeetao  DX: Bladder Cancer    Cycle 1  Previous treatment = n/a    Regimen: Intravesical Gemcitabine  Gemcitabine 2000 mg in  ml instilled intravesically within 3 hours after TURBT  Lacey MIKE, et al. Effect of Intravesical Instillation of Gemcitabine vs Saline Immediately Following Resection of Suspected Low-Grade Non-Muscle-Invasive Bladder Cancer on Tumor Recurrence SWOG  Randomized Clinical Trial. CHACHO. 2018;319(18):9879-3403       Pulse 76   Temp 36.2 °C (97.1 °F) (Temporal)   Resp 16   Ht 1.549 m (5' 1\")   Wt 65.7 kg (144 lb 13.5 oz)   SpO2 97%   BMI 27.37 kg/m²   Body surface area is 1.68 meters squared.  LABS: not required       Gemcitabine 1000 mg in NS 50ml intravesically via cath tipped syringe x 2 syringes   Fixed dose, no calculation required. To be administered by MD. Laura Hickey, PharmD    "

## 2021-03-30 NOTE — ANESTHESIA PROCEDURE NOTES
Airway    Date/Time: 3/30/2021 7:48 AM  Performed by: Joshua Scott M.D.  Authorized by: Joshua Scott M.D.     Location:  OR  Urgency:  Elective  Indications for Airway Management:  Anesthesia      Spontaneous Ventilation: absent    Sedation Level:  Deep  Preoxygenated: Yes    Patient Position:  Sniffing  Mask Difficulty Assessment:  1 - vent by mask  Final Airway Type:  Endotracheal airway  Final Endotracheal Airway:  ETT  Cuffed: Yes    Technique Used for Successful ETT Placement:  Direct laryngoscopy  Devices/Methods Used in Placement:  Intubating stylet and cricoid pressure    Insertion Site:  Oral  Blade Type:  Yaya  Laryngoscope Blade/Videolaryngoscope Blade Size:  3  ETT Size (mm):  7.0  Measured from:  Teeth  ETT to Teeth (cm):  21  Placement Verified by: auscultation and capnometry    Cormack-Lehane Classification:  Grade III - view of epiglottis only  Number of Attempts at Approach:  2  Ventilation Between Attempts:  BVM   Sevoflurane administered during BVM

## 2021-03-30 NOTE — OR NURSING
1128: Pt arrives to PACU asleep and calm. VSS. 3 lap sites and one larger incision all closed with dermabond and GLADYS drain in place to LLQ. Ice pack applied.     1137: Pt more awake A&O x4, stating to feel uncomfortable and having an urge to urinate. Pt was educated on collazo catheter and medicated per MAR. Tolerating sips of water.    1200: Pts  Dominick called and updated on pt status.    1205: Pt sitting up in the gurney. States to not be in pain but does still feel like she has to urinate. Reminded about collazo cathter being in place. Denies nausea.    1215: Report called to Lissette. Pt room was changed and will now be going to 430. Family updated on room change and will stay in PACU.     1310: Pt going to her room with CNA on 3L n.c O2 tank 3/4 full.

## 2021-03-30 NOTE — PROGRESS NOTES
"Pharmacy Chemotherapy Calculation:    Pt Name: Zo Mayo Oppio  DX: Bladder Cancer    Regimen: Intravesical Gemcitabine  Gemcitabine 2000 mg in  ml instilled intravesically within 3 hours after TURBT  Lacey MIKE, et al. Effect of Intravesical Instillation of Gemcitabine vs Saline Immediately Following Resection of Suspected Low-Grade Non-Muscle-Invasive Bladder Cancer on Tumor Recurrence SWOG  Randomized Clinical Trial. CHACHO. 2018;319(18):4286-6816     Pulse 76   Temp 36.2 °C (97.1 °F) (Temporal)   Resp 16   Ht 1.549 m (5' 1\")   Wt 65.7 kg (144 lb 13.5 oz)   SpO2 97%   BMI 27.37 kg/m²   Body surface area is 1.68 meters squared.    LABS: not required        Drug Order   (Drug name, dose, route, IV Fluid & volume, frequency, number of doses) Cycle 1      Previous treatment: n/a     Medication = Gemcitabine (Gemzar)  Base Dose= 1000 mg  Calc Dose: Fixed dose, no calculation required  Final Dose = 1000 mg x 2 syringes  Route = intravesically   Fluid & Volume = NS 50 mL x 2 syringes  Admin Duration = To be administered by MD   To be administered by MD       Fixed dose, no calculation required. Okay to treat with final dose.     By my signature below, I confirm this process was performed independently with the BSA and all final chemotherapy dosing calculations congruent. I have reviewed the above chemotherapy order and that my calculation of the final dose and BSA (when applicable) corroborate those calculations of the  pharmacist. Discrepancies of 10% or greater in the written dose have been addressed and documented within the [facility] Progress notes.    Ramone Cool, PharmD          "

## 2021-03-30 NOTE — PROGRESS NOTES
Report received.  Assessment complete.  A&O x 4. Patient calls appropriately.  Patient was log rolled to bed with slide board.   Patient has 0/10 pain.   Denies N&V.      2 RN Skin Note    x3 lap sites to abdomen CDI, well approximated with dermabond.  GLADYS drain to abdomen, dressing CDI  Transverse incision under GLADYS drain, well approximated with dermabond.      + void via collazo catheter, - flatus, - BM.  Patient denies SOB.  SCD's on.  Review plan with of care with patient. Call light and personal belongings with in reach. Hourly rounding in place. All needs met at this time.

## 2021-03-30 NOTE — CARE PLAN
Problem: Communication  Goal: The ability to communicate needs accurately and effectively will improve    3/30/2021 1356 by Ruth Pandya R.N.  Outcome: PROGRESSING AS EXPECTED     Problem: Safety  Goal: Will remain free from injury    3/30/2021 1356 by Ruth Pandya R.N.  Outcome: PROGRESSING AS EXPECTED

## 2021-03-30 NOTE — OP REPORT
[facility] [place] Operative Report    Pre-operative Diagnosis: 1. transitional cell carcinoma of left ureter, high grade  2. Non-functional left kidney   Post-operative Diagnosis: Same as above   Procedure: 1. Robotic assisted laparoscopic radical right nephroureterectomy  2. Installation of intravesical gemcitabine 2gm  3. Pelvic lymph node dissection   Attending: Mango Gardner M.D., MD   Assistant: Oli Campos MD   Anesthesia: General, ET   Estimated Blood Loss: 50cc       Specimens: 1. Left kidney and ureter sent for pathology  2. Left pelvic lymph nodes   Drains: 1. 18F collazo catheter to gravity drainage  2. 15JP drain   Complications: None   Wound class II Clean contaminated   Condition: Stable, procedure well tolerated    Disposition:  PACU, admission overnight for observation, drain out in AM, Collazo in for 7-10 days. Call with pathology.   Findings: 1. Left renal anatomy with 2 renal arteries, 1 renal veins  2. closure at bladder orifice at end of case running strata fix  3. No concerning large lymphadenopathy.   4. Severe hydroureter and normal appearing distal ureter distal to the obstructing tumor     Indications for Procedure:   The patient is a 75 y.o. year old female with a upper tract transitional cell carcinoma diagnosed with ureteroscopy.  She originally presented with recurrent urinary tract infections and hematuria.  Imaging showed severely hydronephrotic kidney.  Nuclear medicine scan showed 8% function.  Ureteroscopy demonstrated high-grade urothelial carcinoma and unable to pass a wire and/or placed stent at the time of ureteroscopy.  Given the low function of the kidney the decision was made not to perform distal ureterectomy with reimplantation and instead to perform left nephro ureterectomy.  she has elected for robotic  left radical nephroureterectomy. She understands the risks involved, which include, but are not limited to : bleeding, infection, damage to adjacent structures(including  bowel, spleen, pancreas , duodenum), and open conversion, bladder leak, need for collazo post op, and the cardiovascular and pulmonary complications of anesthesia in general and wishes to proceed giving consent.    Procedure in Detail:  After the patient was properly informed and consented, she was brought to the operative suite.  The proper side of the urothelial carcinoma was confirmed with the standard time out procedure.  After the patient was given intravenous prophylactic antibiotics, and after the induction of general anesthesia, the patient was prepped and draped in the usual sterile fashion in the right side up modified flank position.  All bony points were properly padded and the patient was secured to the operating table.    A Verress needle was used to insufflate the peritoneal cavity to 15 mm Hg at the umbilicus. We then marked the trocar positions starting 1-2 fingerbreadth below costal margin just lateral to rectus.  We then marked out 3 additional robot sites coming medially and caudally in an oblique line  by 6-8cm such that the most caudal port was in the midline.  We first placed the 8mm trocar in LUQ after making an 8mm incision and the intra-abdominal contents were inspected which revealed no evidence of intra-abdominal injury from either the Verress needle or port placement. Three additional 8mm trocars were placed under direct vision at all marked sites and a 12**mm assist port was place infraumbilically.     The left colon was mobilized medially along the avascular line of Toldt.   We took down the splenocolic flexure and medialized the colon.  We identified a severely hydronephrotic left ureter. the ureter was visualized coursing over the psoas muscle and this was retracted anteriorly.  Gonadal vein was clipped.  Dissection was then carried cephalad towards the hilar vessels.   The main renal artery and vein were dissected out carefully.    The first small lower pole left renal artery  was taken with a series of hemologic Weck clips and then transected.  The left renal vein was then taken with Endo GUILLERMO stapler.  We then encountered an additional upper pole left renal artery which was taken in a similar fashion to the other artery.  Hemostasis was adequate at this time.  Left  The right adrenal gland was not taken along with the specimen.  The kidney was then freed in all directions and we would later then place it into an Endocatch bag for specimen removal.     We then dissected down along the ureter into the pelvis.  No repositioning or redocking was necessary.  The ureter was dissected circumferentially all the way to the bladder.    Once we were at the level of the bladder, the bladder was opened enabling visualization of the ureteral orifice.   A 3-0 strata fix was placed in the bladder above the ureteral hiatus.  The entire ureter including the ureteral orifice were included with the specimen. The hiatus was closed with the running 3-0 suture which was already pre placed.      Pelvic lymph node dissection:  Once the ureter and kidney were all dissected these were placed into the superior portion of the abdomen.  We then performed pelvic lymph node dissection with the borders being the left external iliac vein, the note of clot cat, medially to the bladder, and superiorly to the bifurcation of the internal and external iliac artery.  We identified the obturator nerve and this was not injured during our lymph node dissection.  The large lymph node packet was then placed into an Endo Catch bag which would later be removed with our main specimen.  Hemostasis was adequate.    We then rechecked the renal hilar for hemostasis.    The robot was undocked. A GLADYS drain was placed in the pelvis through one of the robotic ports.  The kidney and ureter were placed in an endocatch bag. We reinspected the abdomen. Hemostasis was good and no bowel injury was seen.     The robot was then undocked. The LLQ port  site was widened making a molina incision to allow removal of the specimen.  The specimen was brought through the midline and therefore no rectus splitting was necessary. The fascia closed with an 0 PDS suture.  The subcutaneous space was closed with a 3-0 vicryl and the skin with a 4-0 monocryl and covered with dermabond.  All other port sites were closed with 4-0 Monocryl and then Dermabond was applied.      Anesthesia team then performed tap block for postoperative pain.    All sponge, needle, and instrument counts were correct at the end of the case. I was present and directly participated in the entire procedure. The patient will be admitted for routine postoperative care.        Mango Gardner MD  [facility] [address]  [place] [address]  [phone]

## 2021-03-30 NOTE — OR NURSING
Pre op admission completed.  Pt educated on surgical plan of care, all questions answered.  Bed in low position and call light within reach.  Hourly rounding in place.  This RN performed excellent hand hygiene and wore a surgical mask at all times.  Pt and  wore a mask at all times except when doing oral and nasal triple aim care.

## 2021-03-30 NOTE — ANESTHESIA TIME REPORT
Anesthesia Start and Stop Event Times     Date Time Event    3/30/2021 0713 Ready for Procedure     0735 Anesthesia Start     1132 Anesthesia Stop        Responsible Staff  03/30/21    Name Role Begin End    Joshua Soctt M.D. Anesth 0735 1132        Preop Diagnosis (Free Text):  Pre-op Diagnosis     TRANSITIONAL CELL CARCINOMA OF KIDNEY        Preop Diagnosis (Codes):    Post op Diagnosis  Transitional cell carcinoma of kidney (HCC)      Premium Reason  Non-Premium    Comments:

## 2021-03-30 NOTE — ANESTHESIA PROCEDURE NOTES
Peripheral Block    Date/Time: 3/30/2021 11:03 AM  Performed by: Joshua Scott M.D.  Authorized by: Joshua Scott M.D.     Start Time:  3/30/2021 11:03 AM  End Time:  3/30/2021 11:16 AM  Reason for Block: at surgeon's request and post-op pain management ONLY    patient identified, IV checked, site marked, risks and benefits discussed, surgical consent, monitors and equipment checked, pre-op evaluation and timeout performed    Patient Position:  Supine  Prep: ChloraPrep    Monitoring:  Heart rate, continuous pulse ox and cardiac monitor  Block Region:  Trunk  Trunk - Block Type:  Abdominal plane block - TAP block    Laterality:  Left  Procedures: ultrasound guided  Image captured, interpreted and electronically stored.  Strength:  1 %  Dose:  3 ml  Block Type:  Single-shot  Needle Length:  100mm  Needle Gauge:  21 G  Needle Localization:  Ultrasound guidance  Injection Assessment:  Negative aspiration for heme and incremental injection  Evidence of intravascular injection: No

## 2021-03-30 NOTE — ANESTHESIA PREPROCEDURE EVALUATION
Left ureteral cancer  HTN  tachyarrhythmia    Relevant Problems      (+) Hydronephrosis       Physical Exam    Airway   Mallampati: II  TM distance: >3 FB  Neck ROM: full       Cardiovascular - normal exam  Rhythm: regular  Rate: normal  (-) murmur     Dental - normal exam           Pulmonary - normal exam  Breath sounds clear to auscultation     Abdominal    Neurological - normal exam                 Anesthesia Plan    ASA 2       Plan - general       Airway plan will be ETT    (Peripheral nerve block to be discussed with Dr. Gardner. Site to be determined by surgical incisions.)      Induction: intravenous      Pertinent diagnostic labs and testing reviewed    Informed Consent:    Anesthetic plan and risks discussed with patient.

## 2021-03-31 VITALS
BODY MASS INDEX: 27.35 KG/M2 | WEIGHT: 144.84 LBS | OXYGEN SATURATION: 94 % | HEIGHT: 61 IN | HEART RATE: 60 BPM | RESPIRATION RATE: 18 BRPM | SYSTOLIC BLOOD PRESSURE: 109 MMHG | TEMPERATURE: 98 F | DIASTOLIC BLOOD PRESSURE: 66 MMHG

## 2021-03-31 PROBLEM — C64.9 MALIGNANT NEOPLASM OF KIDNEY EXCLUDING RENAL PELVIS (HCC): Status: ACTIVE | Noted: 2021-03-31

## 2021-03-31 PROBLEM — D49.59 URETERAL TUMOR: Status: ACTIVE | Noted: 2021-03-31

## 2021-03-31 LAB
ANION GAP SERPL CALC-SCNC: 10 MMOL/L (ref 7–16)
BUN SERPL-MCNC: 25 MG/DL (ref 8–22)
CALCIUM SERPL-MCNC: 8.8 MG/DL (ref 8.5–10.5)
CHLORIDE SERPL-SCNC: 107 MMOL/L (ref 96–112)
CO2 SERPL-SCNC: 18 MMOL/L (ref 20–33)
CREAT SERPL-MCNC: 1.84 MG/DL (ref 0.5–1.4)
ERYTHROCYTE [DISTWIDTH] IN BLOOD BY AUTOMATED COUNT: 43.2 FL (ref 35.9–50)
GLUCOSE SERPL-MCNC: 130 MG/DL (ref 65–99)
HCT VFR BLD AUTO: 34.4 % (ref 37–47)
HGB BLD-MCNC: 11.5 G/DL (ref 12–16)
MCH RBC QN AUTO: 31.5 PG (ref 27–33)
MCHC RBC AUTO-ENTMCNC: 33.4 G/DL (ref 33.6–35)
MCV RBC AUTO: 94.2 FL (ref 81.4–97.8)
PLATELET # BLD AUTO: 171 K/UL (ref 164–446)
PMV BLD AUTO: 10.2 FL (ref 9–12.9)
POTASSIUM SERPL-SCNC: 4.7 MMOL/L (ref 3.6–5.5)
RBC # BLD AUTO: 3.65 M/UL (ref 4.2–5.4)
SODIUM SERPL-SCNC: 135 MMOL/L (ref 135–145)
WBC # BLD AUTO: 6.6 K/UL (ref 4.8–10.8)

## 2021-03-31 PROCEDURE — 36415 COLL VENOUS BLD VENIPUNCTURE: CPT

## 2021-03-31 PROCEDURE — 700105 HCHG RX REV CODE 258: Performed by: UROLOGY

## 2021-03-31 PROCEDURE — 80048 BASIC METABOLIC PNL TOTAL CA: CPT

## 2021-03-31 PROCEDURE — 85027 COMPLETE CBC AUTOMATED: CPT

## 2021-03-31 PROCEDURE — A9270 NON-COVERED ITEM OR SERVICE: HCPCS | Performed by: UROLOGY

## 2021-03-31 PROCEDURE — 700102 HCHG RX REV CODE 250 W/ 637 OVERRIDE(OP): Performed by: UROLOGY

## 2021-03-31 RX ADMIN — ACETAMINOPHEN 1000 MG: 500 TABLET, FILM COATED ORAL at 03:14

## 2021-03-31 RX ADMIN — CEPHALEXIN 250 MG: 250 CAPSULE ORAL at 05:14

## 2021-03-31 RX ADMIN — OXYBUTYNIN CHLORIDE 10 MG: 10 TABLET, EXTENDED RELEASE ORAL at 05:14

## 2021-03-31 ASSESSMENT — PAIN DESCRIPTION - PAIN TYPE: TYPE: SURGICAL PAIN

## 2021-03-31 NOTE — CARE PLAN
Problem: Urinary Elimination:  Goal: Ability to reestablish a normal urinary elimination pattern will improve  Outcome: PROGRESSING AS EXPECTED  Note: Smith to dd as ordered     Problem: Mobility  Goal: Risk for activity intolerance will decrease  Outcome: PROGRESSING AS EXPECTED  Note: Encouraged ambulation

## 2021-03-31 NOTE — DISCHARGE INSTRUCTIONS
Discharge Instructions    Discharged to home by car with relative. Discharged via wheelchair, hospital escort: Yes.  Special equipment needed: Not Applicable    Be sure to schedule a follow-up appointment with your primary care doctor or any specialists as instructed.     Discharge Plan:   Influenza Vaccine Indication: Not indicated: Previously immunized this influenza season and > 8 years of age    I understand that a diet low in cholesterol, fat, and sodium is recommended for good health. Unless I have been given specific instructions below for another diet, I accept this instruction as my diet prescription.   Other diet: as tolerated    Special Instructions:   Smith care for home given       · Is patient discharged on Warfarin / Coumadin?   No     Depression / Suicide Risk    As you are discharged from this [facility] facility, it is important to learn how to keep safe from harming yourself.    Recognize the warning signs:  · Abrupt changes in personality, positive or negative- including increase in energy   · Giving away possessions  · Change in eating patterns- significant weight changes-  positive or negative  · Change in sleeping patterns- unable to sleep or sleeping all the time   · Unwillingness or inability to communicate  · Depression  · Unusual sadness, discouragement and loneliness  · Talk of wanting to die  · Neglect of personal appearance   · Rebelliousness- reckless behavior  · Withdrawal from people/activities they love  · Confusion- inability to concentrate     If you or a loved one observes any of these behaviors or has concerns about self-harm, here's what you can do:  · Talk about it- your feelings and reasons for harming yourself  · Remove any means that you might use to hurt yourself (examples: pills, rope, extension cords, firearm)  · Get professional help from the community (Mental Health, Substance Abuse, psychological counseling)  · Do not be alone:Call your Safe Contact- someone whom  you trust who will be there for you.  · Call your local CRISIS HOTLINE 829-1162 or 498-475-8787  · Call your local Children's Mobile Crisis Response Team [place] (118) 615-4382 or www.[employer]  · Call the toll free National Suicide Prevention Hotlines   · National Suicide Prevention Lifeline 699-740-RQTA (6430)  · [facility] 800-SUICIDE (722-6514)          Indwelling Urinary Catheter Care, Adult  An indwelling urinary catheter is a thin tube that is put into your bladder. The tube helps to drain pee (urine) out of your body. The tube goes in through your urethra. Your urethra is where pee comes out of your body. Your pee will come out through the catheter, then it will go into a bag (drainage bag).  Take good care of your catheter so it will work well.  How to wear your catheter and bag  Supplies needed  · Sticky tape (adhesive tape) or a leg strap.  · Alcohol wipe or soap and water (if you use tape).  · A clean towel (if you use tape).  · Large overnight bag.  · Smaller bag (leg bag).  Wearing your catheter  Attach your catheter to your leg with tape or a leg strap.  · Make sure the catheter is not pulled tight.  · If a leg strap gets wet, take it off and put on a dry strap.  · If you use tape to hold the bag on your le. Use an alcohol wipe or soap and water to wash your skin where the tape made it sticky before.  2. Use a clean towel to pat-dry that skin.  3. Use new tape to make the bag stay on your leg.  Wearing your bags  You should have been given a large overnight bag.  · You may wear the overnight bag in the day or night.  · Always have the overnight bag lower than your bladder.  Do not let the bag touch the floor.  · Before you go to sleep, put a clean plastic bag in a wastebasket. Then hang the overnight bag inside the wastebasket.  You should also have a smaller leg bag that fits under your clothes.  · Always wear the leg bag below your knee.  · Do not wear your leg bag  at night.  How to care for your skin and catheter  Supplies needed  · A clean washcloth.  · Water and mild soap.  · A clean towel.  Caring for your skin and catheter         · Clean the skin around your catheter every day:  ? Wash your hands with soap and water.  ? Wet a clean washcloth in warm water and mild soap.  ? Clean the skin around your urethra.  ? If you are female:  ? Gently spread the folds of skin around your vagina (labia).  ? With the washcloth in your other hand, wipe the inner side of your labia on each side. Wipe from front to back.  ? If you are male:  ? Pull back any skin that covers the end of your penis (foreskin).  ? With the washcloth in your other hand, wipe your penis in small circles. Start wiping at the tip of your penis, then move away from the catheter.  ? Move the foreskin back in place, if needed.  ? With your free hand, hold the catheter close to where it goes into your body.  ? Keep holding the catheter during cleaning so it does not get pulled out.  ? With the washcloth in your other hand, clean the catheter.  ? Only wipe downward on the catheter.  ? Do not wipe upward toward your body. Doing this may push germs into your urethra and cause infection.  ? Use a clean towel to pat-dry the catheter and the skin around it. Make sure to wipe off all soap.  ? Wash your hands with soap and water.  · Shower every day. Do not take baths.  · Do not use cream, ointment, or lotion on the area where the catheter goes into your body, unless your doctor tells you to.  · Do not use powders, sprays, or lotions on your genital area.  · Check your skin around the catheter every day for signs of infection. Check for:  ? Redness, swelling, or pain.  ? Fluid or blood.  ? Warmth.  ? Pus or a bad smell.  How to empty the bag  Supplies needed  · Rubbing alcohol.  · Gauze pad or cotton ball.  · Tape or a leg strap.  Emptying the bag  Pour the pee out of your bag when it is ?-½ full, or at least 2-3 times a  day. Do this for your overnight bag and your leg bag.  1. Wash your hands with soap and water.  2. Separate (detach) the bag from your leg.  3. Hold the bag over the toilet or a clean pail. Keep the bag lower than your hips and bladder. This is so the pee (urine) does not go back into the tube.  4. Open the pour spout. It is at the bottom of the bag.  5. Empty the pee into the toilet or pail. Do not let the pour spout touch any surface.  6. Put rubbing alcohol on a gauze pad or cotton ball.  7. Use the gauze pad or cotton ball to clean the pour spout.  8. Close the pour spout.  9. Attach the bag to your leg with tape or a leg strap.  10. Wash your hands with soap and water.  Follow instructions for cleaning the drainage bag:  · From the product maker.  · As told by your doctor.  How to change the bag  Supplies needed  · Alcohol wipes.  · A clean bag.  · Tape or a leg strap.  Changing the bag  Replace your bag when it starts to leak, smell bad, or look dirty.  1. Wash your hands with soap and water.  2. Separate the dirty bag from your leg.  3. Pinch the catheter with your fingers so that pee does not spill out.  4. Separate the catheter tube from the bag tube where these tubes connect (at the connection valve). Do not let the tubes touch any surface.  5. Clean the end of the catheter tube with an alcohol wipe. Use a different alcohol wipe to clean the end of the bag tube.  6. Connect the catheter tube to the tube of the clean bag.  7. Attach the clean bag to your leg with tape or a leg strap. Do not make the bag tight on your leg.  8. Wash your hands with soap and water.  General rules    · Never pull on your catheter. Never try to take it out. Doing that can hurt you.  · Always wash your hands before and after you touch your catheter or bag. Use a mild, fragrance-free soap. If you do not have soap and water, use hand .  · Always make sure there are no twists or bends (kinks) in the catheter tube.  · Always  make sure there are no leaks in the catheter or bag.  · Drink enough fluid to keep your pee pale yellow.  · Do not take baths, swim, or use a hot tub.  · If you are female, wipe from front to back after you poop (have a bowel movement).  Contact a doctor if:  · Your pee is cloudy.  · Your pee smells worse than usual.  · Your catheter gets clogged.  · Your catheter leaks.  · Your bladder feels full.  Get help right away if:  · You have redness, swelling, or pain where the catheter goes into your body.  · You have fluid, blood, pus, or a bad smell coming from the area where the catheter goes into your body.  · Your skin feels warm where the catheter goes into your body.  · You have a fever.  · You have pain in your:  ? Belly (abdomen).  ? Legs.  ? Lower back.  ? Bladder.  · You see blood in the catheter.  · Your pee is pink or red.  · You feel sick to your stomach (nauseous).  · You throw up (vomit).  · You have chills.  · Your pee is not draining into the bag.  · Your catheter gets pulled out.  Summary  · An indwelling urinary catheter is a thin tube that is placed into the bladder to help drain pee (urine) out of the body.  · The catheter is placed into the part of the body that drains pee from the bladder (urethra).  · Taking good care of your catheter will keep it working properly and help prevent problems.  · Always wash your hands before and after touching your catheter or bag.  · Never pull on your catheter or try to take it out.  This information is not intended to replace advice given to you by your health care provider. Make sure you discuss any questions you have with your health care provider.  Document Released: 04/14/2014 Document Revised: 04/10/2020 Document Reviewed: 08/03/2018  Elsevier Patient Education © 2020 [employer]

## 2021-03-31 NOTE — CARE PLAN
Problem: Pain Management  Goal: Pain level will decrease to patient's comfort goal  Outcome: PROGRESSING AS EXPECTED  Note: Will continue to monitor patient for pain and provide interventions per MAR.     Problem: Skin Integrity  Goal: Risk for impaired skin integrity will decrease  Outcome: PROGRESSING AS EXPECTED  Note: Encouraging patient to utilize repositioning and offloading to prevent skin breakdown.

## 2021-03-31 NOTE — DISCHARGE SUMMARY
Discharge Summary    CHIEF COMPLAINT ON ADMISSION  1. transitional cell carcinoma of left ureter, high grade  2. Non-functional left kidney    Reason for Admission  TRANSITIONAL CELL CARCINOMA OF KID*     Admission Date  3/30/2021    CODE STATUS  Full Code    HPI & HOSPITAL COURSE  This is a 75 y.o. female here with transitional cell carcinoma of left ureter, high grade, and Non-functional left kidney.  Patient underwent Robotic assisted laparoscopic radical right nephroureterectomy, Installation of intravesical gemcitabine 2gm and pelvic lymph node dissection with Dr Gardner on 3/30/21.     She is post op day 1 and has no complaints.  Tolerating diet, ambulating, good pain control with tylenol.  GLADYS drain with scant output, therefore it is removed prior to discharge.  Patients vitals have remained stable and labs are stable.      Therefore, she is discharged in good and stable condition, WITH COLLAZO, to home with close outpatient follow-up.    Patient has recovered as anticipated.     Discharge Date  March 30, 2021        DISCHARGE DIAGNOSES  1. transitional cell carcinoma of left ureter, high grade  2. Non-functional left kidney    FOLLOW UP  Our office will arrange follow up in clinic in 10 days for collazo removal and 1 month with Dr Gardner.     MEDICATIONS ON DISCHARGE     Medication List      ASK your doctor about these medications      Instructions   acetaminophen 500 MG Tabs  Commonly known as: TYLENOL   Take 500 mg by mouth every 6 hours as needed.  Dose: 500 mg     cephALEXin 250 MG Caps  Commonly known as: KEFLEX   Take 1 capsule by mouth every day.  Dose: 250 mg     DILTIAZem  MG Cp24  Commonly known as: Cartia XT   Take 1 Cap by mouth every day.  Dose: 120 mg     lisinopril 20 MG Tabs  Commonly known as: PRINIVIL   Take 1 Tab by mouth every day.  Dose: 20 mg     magnesium citrate Soln   Take 300 mL by mouth one time.  Dose: 300 mL     oxybutynin SR 10 MG CR tablet  Commonly known as: DITROPAN-XL    Take 1 tablet by mouth 1 time a day as needed.  Dose: 10 mg     triamcinolone acetonide 0.1 % Crea  Commonly known as: KENALOG   Apply 1 Application topically as needed.  Dose: 1 Application     Tylenol PM Extra Strength  MG Tabs  Generic drug: diphenhydrAMINE-APAP (sleep)   Take 1 tablet by mouth every bedtime.  Dose: 1 tablet            Allergies  No Known Allergies    DIET  Orders Placed This Encounter   Procedures   • Diet Order Diet: Regular     Standing Status:   Standing     Number of Occurrences:   1     Order Specific Question:   Diet:     Answer:   Regular [1]       ACTIVITY  Ambulate  No lifting >10lbs    CONSULTATIONS  none    PROCEDURES  1. Robotic assisted laparoscopic radical right nephroureterectomy  2. Installation of intravesical gemcitabine 2gm  3. Pelvic lymph node dissection    LABORATORY  Lab Results   Component Value Date    SODIUM 135 03/31/2021    POTASSIUM 4.7 03/31/2021    CHLORIDE 107 03/31/2021    CO2 18 (L) 03/31/2021    GLUCOSE 130 (H) 03/31/2021    BUN 25 (H) 03/31/2021    CREATININE 1.84 (H) 03/31/2021        Lab Results   Component Value Date    WBC 6.6 03/31/2021    HEMOGLOBIN 11.5 (L) 03/31/2021    HEMATOCRIT 34.4 (L) 03/31/2021    PLATELETCT 171 03/31/2021        Total time of the discharge process exceeds 32 minutes.

## 2021-03-31 NOTE — PROGRESS NOTES
Collazo care instructions provided, collazo bag switched to leg bag, GLADYS drain removed as ordered.
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
## 2021-03-31 NOTE — PROGRESS NOTES
"Assumed care of patient this evening. Assessment complete on 3L per NC. /60   Pulse 68   Temp 36.9 °C (98.4 °F) (Temporal)   Resp 16   Ht 1.549 m (5' 1\")   Wt 65.7 kg (144 lb 13.5 oz)   SpO2 97%   BMI 27.37 kg/m² . Patient is A&Ox4. Currently complains of 2/10 pain, scheduled tylenol given. Patient complaining of bladder pressure but decreasing. On a regular diet tolerating well.     Smith in place + Output. Pt has 2 lap sites with dermabond and a transverse incision with gauze and tape. As well as a GLADYS to the mid R abdomen.     Education provided on IS. Plan of care discussed for the day, bed is locked and in the lowest position, all questions answered at this time. Hourly rounding in place, reinforced the use of the call light. All needs met at this time.   "

## 2021-04-06 NOTE — DOCUMENTATION QUERY
"                                                                         [facility]                                                                       Query Response Note      PATIENT:               MP LEYVA  ACCT #:                  [id number]  MRN:                     [id number]  :                      1945  ADMIT DATE:       3/30/2021 5:42 AM  DISCH DATE:        3/31/2021 3:39 PM  RESPONDING  PROVIDER #:        [id number]           QUERY TEXT:    The finding of \"Invasive high-grade papillary urothelial carcinoma involving the ureter with a separate, non-invasive component involving the renal calyces\" is documented in the pathology report.    Per coding guidelines, coders cannot code diagnosis from the pathology report without the Attending Physician's documentation of the diagnosis. Based on clinical findings, risk factors and treatment, can this diagnosis be further specified?    NOTE:  If an appropriate response is not listed below, please respond with a new note.      Valentina Ramirez,   [email or web address]      The patient's Clinical Indicators include:  Patient is a 74 y/o female admitted for surgical treatment of transitional cell carcinoma of the left ureter (high grade).  Patient underwent successful radical left rephroureterectomy.  Final pathology is available:    FINAL DIAGNOSIS:    A. Left pelvic lymph nodes:  Seven lymph nodes, each negative for metastatic carcinoma (0/7).  B. Left kidney and left ureter:  Invasive high-grade papillary urothelial carcinoma involving the ureter with a separate, non-invasive component involving the  renal calyces.    Surgical margins of resection, uninvolved by carcinoma. One periureteric lymph node, negative for metastatic carcinoma  (0/1).    Synoptic Report for Carcinomas of the Ureter and Renal  Pelvis(Resection):    -Procedure: Robot-assisted laparoscopic nephroureterectomy  -Specimen Laterality: Left  -Tumor Site: Ureter and renal calyces  -Tumor Size: " 5.7 x 1.9 cm (ureteral tumor)  -Histologic Type: Papillary urothelial carcinoma, invasive  -Associated Epithelial Lesions: Not identified  -Histologic Grade: High-grade  -Tumor Extension: Tumor focally invades muscularis of ureter  -Margins: All margins uninvolved by urothelial carcinoma  -Regional Lymph Nodes:  Number of Lymph Nodes Involved: 0  Number of Lymph Nodes Examined: 8  -Pathologic Staging:  Primary tumor: pT2  Regional Lymph Nodes: pN0  Distant metastasis: Not applicable  -Pathologic Findings in Ipsilateral Non-neoplastic Renal Tissue:  Adjacent renal parenchyma demonstrating compression atrophy and  cystic degeneration, secondary to marked hydronephrosis  Options provided:   -- Agree with pathology findings of ureteral cancer that is metastatic to the renal calyces   -- Agree with pathology findings of ureteral cancer NO metastases to the renal calyces   -- Agree with pathology findings of ureteral cancer with additional primary cancer of the renal calyces   -- Unable to determine      Query created by: Rosaura Ramirez on 4/5/2021 12:33 PM    RESPONSE TEXT:    Agree with pathology findings of ureteral cancer with additional primary cancer of the renal calyces          Electronically signed by:  DALLAS ELIZONDO MD 4/6/2021 7:13 AM

## 2021-05-04 ENCOUNTER — HOSPITAL ENCOUNTER (OUTPATIENT)
Facility: MEDICAL CENTER | Age: 76
End: 2021-05-04
Attending: PHYSICIAN ASSISTANT
Payer: MEDICARE

## 2021-05-04 PROCEDURE — 87077 CULTURE AEROBIC IDENTIFY: CPT | Mod: 91

## 2021-05-04 PROCEDURE — 87086 URINE CULTURE/COLONY COUNT: CPT

## 2021-05-04 PROCEDURE — 87186 SC STD MICRODIL/AGAR DIL: CPT

## 2021-05-07 LAB
BACTERIA UR CULT: ABNORMAL
BACTERIA UR CULT: ABNORMAL
SIGNIFICANT IND 70042: ABNORMAL
SITE SITE: ABNORMAL
SOURCE SOURCE: ABNORMAL

## 2021-05-08 DIAGNOSIS — I10 ESSENTIAL HYPERTENSION: ICD-10-CM

## 2021-05-08 DIAGNOSIS — I47.10 SVT (SUPRAVENTRICULAR TACHYCARDIA) (HCC): ICD-10-CM

## 2021-05-11 RX ORDER — LISINOPRIL 20 MG/1
TABLET ORAL
Qty: 90 TABLET | Refills: 2 | Status: SHIPPED | OUTPATIENT
Start: 2021-05-11 | End: 2021-05-14 | Stop reason: SDUPTHER

## 2021-05-14 ENCOUNTER — OFFICE VISIT (OUTPATIENT)
Dept: CARDIOLOGY | Facility: MEDICAL CENTER | Age: 76
End: 2021-05-14
Payer: MEDICARE

## 2021-05-14 VITALS
RESPIRATION RATE: 16 BRPM | HEART RATE: 87 BPM | BODY MASS INDEX: 27.23 KG/M2 | SYSTOLIC BLOOD PRESSURE: 140 MMHG | OXYGEN SATURATION: 96 % | DIASTOLIC BLOOD PRESSURE: 80 MMHG | WEIGHT: 148 LBS | HEIGHT: 62 IN

## 2021-05-14 DIAGNOSIS — I10 ESSENTIAL HYPERTENSION: ICD-10-CM

## 2021-05-14 DIAGNOSIS — N18.4 CKD (CHRONIC KIDNEY DISEASE) STAGE 4, GFR 15-29 ML/MIN (HCC): ICD-10-CM

## 2021-05-14 DIAGNOSIS — I49.1 PAC (PREMATURE ATRIAL CONTRACTION): ICD-10-CM

## 2021-05-14 DIAGNOSIS — I49.3 PVC (PREMATURE VENTRICULAR CONTRACTION): ICD-10-CM

## 2021-05-14 DIAGNOSIS — I10 HTN (HYPERTENSION), MALIGNANT: ICD-10-CM

## 2021-05-14 DIAGNOSIS — I47.10 SVT (SUPRAVENTRICULAR TACHYCARDIA) (HCC): ICD-10-CM

## 2021-05-14 DIAGNOSIS — R00.2 PALPITATIONS: ICD-10-CM

## 2021-05-14 DIAGNOSIS — Z79.899 HIGH RISK MEDICATION USE: ICD-10-CM

## 2021-05-14 PROCEDURE — 99214 OFFICE O/P EST MOD 30 MIN: CPT | Performed by: INTERNAL MEDICINE

## 2021-05-14 RX ORDER — LISINOPRIL 20 MG/1
TABLET ORAL
Qty: 90 TABLET | Refills: 3 | Status: SHIPPED | OUTPATIENT
Start: 2021-05-14 | End: 2022-08-08

## 2021-05-14 RX ORDER — AMLODIPINE BESYLATE 5 MG/1
5 TABLET ORAL DAILY
Qty: 90 TABLET | Refills: 4 | Status: SHIPPED | OUTPATIENT
Start: 2021-05-14 | End: 2022-08-08

## 2021-05-14 ASSESSMENT — ENCOUNTER SYMPTOMS
CHILLS: 0
ORTHOPNEA: 0
LOSS OF CONSCIOUSNESS: 0
DEPRESSION: 0
SENSORY CHANGE: 0
NAUSEA: 0
EYE PAIN: 0
ABDOMINAL PAIN: 0
DOUBLE VISION: 0
PALPITATIONS: 0
COUGH: 0
BLURRED VISION: 0
SHORTNESS OF BREATH: 0
VOMITING: 0
PND: 0
MYALGIAS: 0
HALLUCINATIONS: 0
SPEECH CHANGE: 0
CLAUDICATION: 0
BLOOD IN STOOL: 0
FEVER: 0
WEIGHT LOSS: 0
FALLS: 0
HEADACHES: 0
EYE DISCHARGE: 0
BRUISES/BLEEDS EASILY: 0
DIZZINESS: 0

## 2021-05-14 ASSESSMENT — FIBROSIS 4 INDEX: FIB4 SCORE: 2.065591117977289006

## 2021-05-14 NOTE — PROGRESS NOTES
"Chief Complaint   Patient presents with   • Supraventricular Tachycardia (SVT)       Subjective:   Zo Sanders is a 75 y.o. female who presents today for cardiac care for occasional PAC and PVCs. No malignant arrhythmias.     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    Patient has been feeling better these days. No chest pain. No dyspnea.     GFR of 27. I personally interpreted the blood test result.    Past Medical History:   Diagnosis Date   • Arrhythmia     \"too fast\"   • Cancer (HCC) 10/2020    uretral   • Cataract 03/26/2021    no surgery yet   • Hypertension      Past Surgical History:   Procedure Laterality Date   • PB LAP,PELVIC LYMPHADENECTOMY Left 3/30/2021    Procedure: LYMPHADENECTOMY, ROBOT-ASSISTED, USING DA GERALD XI - PELVIC AND INSTILLATION OF INTRAVESICAL CHEMOTHERAPY(GEMCITABINE);  Surgeon: Mango Gardner M.D.;  Location: [facility];  Service: Uro Robotic   • NEPHROURETERECTOMY ROBOTIC XI Left 3/30/2021    Procedure: NEPHROURETERECTOMY, ROBOT-ASSISTED, USING DA GERALD XI;  Surgeon: Mango Gardner M.D.;  Location: [facility];  Service: Uro Robotic   • PB CYSTOSCOPY,INSERT URETERAL STENT Left 2/16/2021    Procedure: CYSTOSCOPY, WITH LEFT RETROGRADE URETEROPYELOGRAM;  Surgeon: Mango Gardner M.D.;  Location: [facility];  Service: Urology   • PB CYSTO/URETERO/PYELOSCOPY, DX Left 2/16/2021    Procedure: LEFT URETEROSCOPY - WITH LEFT URETERAL MASS BIOPSY;  Surgeon: Mango Gardner M.D.;  Location: [facility];  Service: Urology   • HYSTERECTOMY, TOTAL ABDOMINAL  1997     Family History   Problem Relation Age of Onset   • Heart Disease Mother      Social History     Socioeconomic History   • Marital status:      Spouse name: Not on file   • Number of children: Not on file   • Years of education: Not on file   • Highest education level: Not on file   Occupational History   • Not on file   Tobacco Use   • Smoking " status: Former Smoker     Quit date: 1971     Years since quittin.8   • Smokeless tobacco: Never Used   Vaping Use   • Vaping Use: Never used   Substance and Sexual Activity   • Alcohol use: No   • Drug use: No   • Sexual activity: Not on file   Other Topics Concern   • Not on file   Social History Narrative   • Not on file     Social Determinants of Health     Financial Resource Strain:    • Difficulty of Paying Living Expenses:    Food Insecurity:    • Worried About Running Out of Food in the Last Year:    • Ran Out of Food in the Last Year:    Transportation Needs:    • Lack of Transportation (Medical):    • Lack of Transportation (Non-Medical):    Physical Activity:    • Days of Exercise per Week:    • Minutes of Exercise per Session:    Stress:    • Feeling of Stress :    Social Connections:    • Frequency of Communication with Friends and Family:    • Frequency of Social Gatherings with Friends and Family:    • Attends Jew Services:    • Active Member of Clubs or Organizations:    • Attends Club or Organization Meetings:    • Marital Status:    Intimate Partner Violence:    • Fear of Current or Ex-Partner:    • Emotionally Abused:    • Physically Abused:    • Sexually Abused:      No Known Allergies  Outpatient Encounter Medications as of 2021   Medication Sig Dispense Refill   • lisinopril (PRINIVIL) 20 MG Tab TAKE 1 TABLET BY MOUTH ONCE DAILY 90 tablet 2   • diphenhydrAMINE-APAP, sleep, (TYLENOL PM EXTRA STRENGTH)  MG Tab Take 1 tablet by mouth every bedtime.     • acetaminophen (TYLENOL) 500 MG Tab Take 500 mg by mouth every 6 hours as needed.     • oxybutynin SR (DITROPAN-XL) 10 MG CR tablet Take 1 tablet by mouth 1 time a day as needed. (Patient taking differently: Take 10 mg by mouth every day.) 21 tablet 01   • triamcinolone acetonide (KENALOG) 0.1 % Cream Apply 1 Application topically as needed.     • [DISCONTINUED] magnesium citrate Solution Take 300 mL by mouth one time.  "(Patient not taking: Reported on 5/14/2021)     • [DISCONTINUED] cephALEXin (KEFLEX) 250 MG Cap Take 1 capsule by mouth every day. (Patient not taking: Reported on 5/14/2021) 90 capsule 3   • [DISCONTINUED] DILTIAZem CD (CARTIA XT) 120 MG CAPSULE SR 24 HR Take 1 Cap by mouth every day. (Patient not taking: Reported on 5/14/2021) 90 Cap 3     No facility-administered encounter medications on file as of 5/14/2021.     Review of Systems   Constitutional: Negative for chills, fever, malaise/fatigue and weight loss.   HENT: Negative for ear discharge, ear pain, hearing loss and nosebleeds.    Eyes: Negative for blurred vision, double vision, pain and discharge.   Respiratory: Negative for cough and shortness of breath.    Cardiovascular: Negative for chest pain, palpitations, orthopnea, claudication, leg swelling and PND.   Gastrointestinal: Negative for abdominal pain, blood in stool, melena, nausea and vomiting.   Genitourinary: Negative for dysuria and hematuria.   Musculoskeletal: Negative for falls, joint pain and myalgias.   Skin: Negative for itching and rash.   Neurological: Negative for dizziness, sensory change, speech change, loss of consciousness and headaches.   Endo/Heme/Allergies: Negative for environmental allergies. Does not bruise/bleed easily.   Psychiatric/Behavioral: Negative for depression, hallucinations and suicidal ideas.        Objective:   /80 (BP Location: Left arm, Patient Position: Sitting, BP Cuff Size: Adult)   Pulse 87   Resp 16   Ht 1.575 m (5' 2\")   Wt 67.1 kg (148 lb)   SpO2 96%   BMI 27.07 kg/m²     Physical Exam   Constitutional: She is oriented to person, place, and time. No distress.   HENT:   Head: Normocephalic and atraumatic.   Right Ear: External ear normal.   Left Ear: External ear normal.   Eyes: Right eye exhibits no discharge. Left eye exhibits no discharge.   Neck: No JVD present. No thyromegaly present.   Cardiovascular: Normal rate, regular rhythm and normal " heart sounds. Exam reveals no gallop and no friction rub.   No murmur heard.  Pulmonary/Chest: Breath sounds normal. No respiratory distress.   Abdominal: Bowel sounds are normal. She exhibits no distension. There is no abdominal tenderness.   Musculoskeletal:         General: No tenderness.   Neurological: She is alert and oriented to person, place, and time. No cranial nerve deficit.   Skin: Skin is warm and dry. She is not diaphoretic.   Psychiatric: Her behavior is normal.   Nursing note and vitals reviewed.      Assessment:     1. PAC (premature atrial contraction)     2. PVC (premature ventricular contraction)     3. HTN (hypertension), malignant     4. SVT (supraventricular tachycardia) ([facility])     5. Palpitations     6. High risk medication use     7. CKD (chronic kidney disease) stage 4, GFR 15-29 ml/min ([facility])         Medical Decision Making:  Today's Assessment / Status / Plan:   At this time patient is clinically stable in terms of her cardiac standpoint.  Blood pressure is not well controlled.  Trial of amlodipine 5 mg daily.       I will see patient back in clinic with lab tests and studies results in 3 months.     I thank you Dr. Lan for referring patient to our Cardiology Clinic today.

## 2021-05-24 ENCOUNTER — HOSPITAL ENCOUNTER (OUTPATIENT)
Dept: LAB | Facility: MEDICAL CENTER | Age: 76
End: 2021-05-24
Attending: INTERNAL MEDICINE
Payer: MEDICARE

## 2021-05-24 ENCOUNTER — HOSPITAL ENCOUNTER (OUTPATIENT)
Dept: LAB | Facility: MEDICAL CENTER | Age: 76
End: 2021-05-24
Attending: FAMILY MEDICINE
Payer: MEDICARE

## 2021-05-24 DIAGNOSIS — N18.4 CKD (CHRONIC KIDNEY DISEASE) STAGE 4, GFR 15-29 ML/MIN (HCC): ICD-10-CM

## 2021-05-24 DIAGNOSIS — I10 HTN (HYPERTENSION), MALIGNANT: ICD-10-CM

## 2021-05-24 DIAGNOSIS — I49.1 PAC (PREMATURE ATRIAL CONTRACTION): ICD-10-CM

## 2021-05-24 DIAGNOSIS — Z79.899 HIGH RISK MEDICATION USE: ICD-10-CM

## 2021-05-24 DIAGNOSIS — I47.10 SVT (SUPRAVENTRICULAR TACHYCARDIA) (HCC): ICD-10-CM

## 2021-05-24 DIAGNOSIS — R00.2 PALPITATIONS: ICD-10-CM

## 2021-05-24 DIAGNOSIS — I49.3 PVC (PREMATURE VENTRICULAR CONTRACTION): ICD-10-CM

## 2021-05-24 LAB
ALBUMIN SERPL BCP-MCNC: 4 G/DL (ref 3.2–4.9)
ALBUMIN/GLOB SERPL: 1.4 G/DL
ALP SERPL-CCNC: 95 U/L (ref 30–99)
ALT SERPL-CCNC: 24 U/L (ref 2–50)
ANION GAP SERPL CALC-SCNC: 8 MMOL/L (ref 7–16)
ANION GAP SERPL CALC-SCNC: 9 MMOL/L (ref 7–16)
AST SERPL-CCNC: 25 U/L (ref 12–45)
BASOPHILS # BLD AUTO: 0.6 % (ref 0–1.8)
BASOPHILS # BLD: 0.02 K/UL (ref 0–0.12)
BILIRUB SERPL-MCNC: 0.3 MG/DL (ref 0.1–1.5)
BUN SERPL-MCNC: 22 MG/DL (ref 8–22)
BUN SERPL-MCNC: 23 MG/DL (ref 8–22)
CALCIUM SERPL-MCNC: 9.3 MG/DL (ref 8.5–10.5)
CALCIUM SERPL-MCNC: 9.4 MG/DL (ref 8.5–10.5)
CHLORIDE SERPL-SCNC: 106 MMOL/L (ref 96–112)
CHLORIDE SERPL-SCNC: 108 MMOL/L (ref 96–112)
CHOLEST SERPL-MCNC: 170 MG/DL (ref 100–199)
CHOLEST SERPL-MCNC: 172 MG/DL (ref 100–199)
CO2 SERPL-SCNC: 21 MMOL/L (ref 20–33)
CO2 SERPL-SCNC: 23 MMOL/L (ref 20–33)
CREAT SERPL-MCNC: 1.41 MG/DL (ref 0.5–1.4)
CREAT SERPL-MCNC: 1.43 MG/DL (ref 0.5–1.4)
EOSINOPHIL # BLD AUTO: 0.22 K/UL (ref 0–0.51)
EOSINOPHIL NFR BLD: 6.5 % (ref 0–6.9)
ERYTHROCYTE [DISTWIDTH] IN BLOOD BY AUTOMATED COUNT: 49.9 FL (ref 35.9–50)
FASTING STATUS PATIENT QL REPORTED: NORMAL
FASTING STATUS PATIENT QL REPORTED: NORMAL
GLOBULIN SER CALC-MCNC: 2.8 G/DL (ref 1.9–3.5)
GLUCOSE SERPL-MCNC: 90 MG/DL (ref 65–99)
GLUCOSE SERPL-MCNC: 91 MG/DL (ref 65–99)
HCT VFR BLD AUTO: 40.6 % (ref 37–47)
HDLC SERPL-MCNC: 52 MG/DL
HDLC SERPL-MCNC: 53 MG/DL
HGB BLD-MCNC: 12.9 G/DL (ref 12–16)
IMM GRANULOCYTES # BLD AUTO: 0.01 K/UL (ref 0–0.11)
IMM GRANULOCYTES NFR BLD AUTO: 0.3 % (ref 0–0.9)
LDLC SERPL CALC-MCNC: 90 MG/DL
LDLC SERPL CALC-MCNC: 90 MG/DL
LYMPHOCYTES # BLD AUTO: 0.95 K/UL (ref 1–4.8)
LYMPHOCYTES NFR BLD: 28 % (ref 22–41)
MCH RBC QN AUTO: 31.2 PG (ref 27–33)
MCHC RBC AUTO-ENTMCNC: 31.8 G/DL (ref 33.6–35)
MCV RBC AUTO: 98.1 FL (ref 81.4–97.8)
MONOCYTES # BLD AUTO: 0.28 K/UL (ref 0–0.85)
MONOCYTES NFR BLD AUTO: 8.3 % (ref 0–13.4)
NEUTROPHILS # BLD AUTO: 1.91 K/UL (ref 2–7.15)
NEUTROPHILS NFR BLD: 56.3 % (ref 44–72)
NRBC # BLD AUTO: 0 K/UL
NRBC BLD-RTO: 0 /100 WBC
PLATELET # BLD AUTO: 215 K/UL (ref 164–446)
PMV BLD AUTO: 10.2 FL (ref 9–12.9)
POTASSIUM SERPL-SCNC: 5.2 MMOL/L (ref 3.6–5.5)
POTASSIUM SERPL-SCNC: 5.2 MMOL/L (ref 3.6–5.5)
PROT SERPL-MCNC: 6.8 G/DL (ref 6–8.2)
RBC # BLD AUTO: 4.14 M/UL (ref 4.2–5.4)
SODIUM SERPL-SCNC: 137 MMOL/L (ref 135–145)
SODIUM SERPL-SCNC: 138 MMOL/L (ref 135–145)
TRIGL SERPL-MCNC: 140 MG/DL (ref 0–149)
TRIGL SERPL-MCNC: 146 MG/DL (ref 0–149)
TSH SERPL DL<=0.005 MIU/L-ACNC: 2.12 UIU/ML (ref 0.38–5.33)
WBC # BLD AUTO: 3.4 K/UL (ref 4.8–10.8)

## 2021-05-24 PROCEDURE — 85025 COMPLETE CBC W/AUTO DIFF WBC: CPT

## 2021-05-24 PROCEDURE — 80061 LIPID PANEL: CPT

## 2021-05-24 PROCEDURE — 80053 COMPREHEN METABOLIC PANEL: CPT

## 2021-05-24 PROCEDURE — 80048 BASIC METABOLIC PNL TOTAL CA: CPT

## 2021-05-24 PROCEDURE — 84443 ASSAY THYROID STIM HORMONE: CPT

## 2021-05-24 PROCEDURE — 80061 LIPID PANEL: CPT | Mod: 91

## 2021-05-24 PROCEDURE — 36415 COLL VENOUS BLD VENIPUNCTURE: CPT

## 2021-07-16 ENCOUNTER — HOSPITAL ENCOUNTER (OUTPATIENT)
Dept: CARDIOLOGY | Facility: MEDICAL CENTER | Age: 76
End: 2021-07-16
Attending: INTERNAL MEDICINE
Payer: MEDICARE

## 2021-07-16 ENCOUNTER — TELEPHONE (OUTPATIENT)
Dept: CARDIOLOGY | Facility: MEDICAL CENTER | Age: 76
End: 2021-07-16

## 2021-07-16 DIAGNOSIS — Z79.899 HIGH RISK MEDICATION USE: ICD-10-CM

## 2021-07-16 DIAGNOSIS — I47.10 SVT (SUPRAVENTRICULAR TACHYCARDIA) (HCC): ICD-10-CM

## 2021-07-16 DIAGNOSIS — I49.3 PVC (PREMATURE VENTRICULAR CONTRACTION): ICD-10-CM

## 2021-07-16 DIAGNOSIS — R00.2 PALPITATIONS: ICD-10-CM

## 2021-07-16 DIAGNOSIS — I49.1 PAC (PREMATURE ATRIAL CONTRACTION): ICD-10-CM

## 2021-07-16 DIAGNOSIS — N18.4 CKD (CHRONIC KIDNEY DISEASE) STAGE 4, GFR 15-29 ML/MIN (HCC): ICD-10-CM

## 2021-07-16 DIAGNOSIS — I10 HTN (HYPERTENSION), MALIGNANT: ICD-10-CM

## 2021-07-16 LAB
LV EJECT FRACT  99904: 65
LV EJECT FRACT MOD 2C 99903: 70.93
LV EJECT FRACT MOD 4C 99902: 65
LV EJECT FRACT MOD BP 99901: 68.51

## 2021-07-16 PROCEDURE — 93306 TTE W/DOPPLER COMPLETE: CPT

## 2021-07-16 PROCEDURE — 93306 TTE W/DOPPLER COMPLETE: CPT | Mod: 26 | Performed by: INTERNAL MEDICINE

## 2021-07-16 NOTE — TELEPHONE ENCOUNTER
ANTHONY Long R.N. Dear Marissa,     Can you please let Zo Mayo Smitavicki know that result is ok and I will see patient as scheduled?     Thank you,   Mookie.   -------------------------------------------------------------------------------------------------------------------------------    See Pascual ramirez

## 2021-07-16 NOTE — RESULT ENCOUNTER NOTE
Dear Karolina,    Can you please let Zo Cisneros Josevegalillie Sanders know that result is ok and I will see patient as scheduled?    Thank you,  Mookie.

## 2021-11-19 ENCOUNTER — OFFICE VISIT (OUTPATIENT)
Dept: CARDIOLOGY | Facility: MEDICAL CENTER | Age: 76
End: 2021-11-19
Payer: MEDICARE

## 2021-11-19 VITALS
BODY MASS INDEX: 27.6 KG/M2 | RESPIRATION RATE: 16 BRPM | SYSTOLIC BLOOD PRESSURE: 130 MMHG | HEIGHT: 62 IN | WEIGHT: 150 LBS | HEART RATE: 62 BPM | DIASTOLIC BLOOD PRESSURE: 76 MMHG | OXYGEN SATURATION: 96 %

## 2021-11-19 DIAGNOSIS — I47.10 SVT (SUPRAVENTRICULAR TACHYCARDIA) (HCC): ICD-10-CM

## 2021-11-19 DIAGNOSIS — N18.4 CKD (CHRONIC KIDNEY DISEASE) STAGE 4, GFR 15-29 ML/MIN (HCC): ICD-10-CM

## 2021-11-19 DIAGNOSIS — I10 HTN (HYPERTENSION), MALIGNANT: ICD-10-CM

## 2021-11-19 DIAGNOSIS — R00.2 PALPITATIONS: ICD-10-CM

## 2021-11-19 DIAGNOSIS — I35.1 MILD AORTIC REGURGITATION: ICD-10-CM

## 2021-11-19 DIAGNOSIS — Z79.899 HIGH RISK MEDICATION USE: ICD-10-CM

## 2021-11-19 DIAGNOSIS — I49.3 PVC (PREMATURE VENTRICULAR CONTRACTION): ICD-10-CM

## 2021-11-19 DIAGNOSIS — I49.1 PAC (PREMATURE ATRIAL CONTRACTION): ICD-10-CM

## 2021-11-19 PROCEDURE — 99214 OFFICE O/P EST MOD 30 MIN: CPT | Performed by: INTERNAL MEDICINE

## 2021-11-19 RX ORDER — NITROFURANTOIN MACROCRYSTALS 50 MG/1
CAPSULE ORAL
COMMUNITY
End: 2021-11-19

## 2021-11-19 RX ORDER — BROMFENAC 0.76 MG/ML
SOLUTION/ DROPS OPHTHALMIC
COMMUNITY
End: 2021-11-19

## 2021-11-19 RX ORDER — AMLODIPINE BESYLATE 10 MG/1
10 TABLET ORAL DAILY
COMMUNITY
End: 2022-09-27

## 2021-11-19 RX ORDER — BROMFENAC 0.76 MG/ML
SOLUTION/ DROPS OPHTHALMIC
COMMUNITY
Start: 2021-09-16 | End: 2021-11-19

## 2021-11-19 ASSESSMENT — ENCOUNTER SYMPTOMS
BRUISES/BLEEDS EASILY: 0
SENSORY CHANGE: 0
PALPITATIONS: 0
ORTHOPNEA: 0
FALLS: 0
COUGH: 0
BLOOD IN STOOL: 0
DEPRESSION: 0
HALLUCINATIONS: 0
EYE DISCHARGE: 0
CHILLS: 0
EYE PAIN: 0
CLAUDICATION: 0
MYALGIAS: 0
LOSS OF CONSCIOUSNESS: 0
SHORTNESS OF BREATH: 0
NAUSEA: 0
BLURRED VISION: 0
PND: 0
ABDOMINAL PAIN: 0
HEADACHES: 0
DOUBLE VISION: 0
WEIGHT LOSS: 0
FEVER: 0
DIZZINESS: 0
SPEECH CHANGE: 0
VOMITING: 0

## 2021-11-19 ASSESSMENT — FIBROSIS 4 INDEX: FIB4 SCORE: 1.83

## 2021-11-19 NOTE — PROGRESS NOTES
"Chief Complaint   Patient presents with   • Premature Atrial Contractions (PACs)   • Supraventricular Tachycardia (SVT)   • Premature Ventricular Contractions (PVCs)       Subjective:   Zo Sanders is a 76 y.o. female who presents today for cardiac care for occasional PAC and PVCs. No malignant arrhythmias.     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    Patient has been feeling better these days. No chest pain. No dyspnea.     GFR of 36. I personally interpreted the blood test result. LDL of 90, Trig of 140.    I have independently interpreted and reviewed echocardiogram's actual images with patient which showed normal left ventricular systolic function. No wall motion abnormality. No evidence of pulmonary hypertension. Mild AI.    Past Medical History:   Diagnosis Date   • Arrhythmia     \"too fast\"   • Cancer (HCC) 10/2020    uretral   • Cataract 03/26/2021    no surgery yet   • Hypertension      Past Surgical History:   Procedure Laterality Date   • PB LAP,PELVIC LYMPHADENECTOMY Left 3/30/2021    Procedure: LYMPHADENECTOMY, ROBOT-ASSISTED, USING DA GERALD XI - PELVIC AND INSTILLATION OF INTRAVESICAL CHEMOTHERAPY(GEMCITABINE);  Surgeon: Mango Gardner M.D.;  Location: [facility];  Service: Uro Robotic   • NEPHROURETERECTOMY ROBOTIC XI Left 3/30/2021    Procedure: NEPHROURETERECTOMY, ROBOT-ASSISTED, USING DA GERALD XI;  Surgeon: Mango Gardner M.D.;  Location: [facility];  Service: Uro Robotic   • PB CYSTOSCOPY,INSERT URETERAL STENT Left 2/16/2021    Procedure: CYSTOSCOPY, WITH LEFT RETROGRADE URETEROPYELOGRAM;  Surgeon: Mango Gardner M.D.;  Location: [facility];  Service: Urology   • PB CYSTO/URETERO/PYELOSCOPY, DX Left 2/16/2021    Procedure: LEFT URETEROSCOPY - WITH LEFT URETERAL MASS BIOPSY;  Surgeon: Mango Gardner M.D.;  Location: [facility];  Service: Urology   • HYSTERECTOMY, TOTAL ABDOMINAL  1997     Family History "   Problem Relation Age of Onset   • Heart Disease Mother      Social History     Socioeconomic History   • Marital status:      Spouse name: Not on file   • Number of children: Not on file   • Years of education: Not on file   • Highest education level: Not on file   Occupational History   • Not on file   Tobacco Use   • Smoking status: Former Smoker     Quit date: 1971     Years since quittin.3   • Smokeless tobacco: Never Used   Vaping Use   • Vaping Use: Never used   Substance and Sexual Activity   • Alcohol use: No   • Drug use: No   • Sexual activity: Not on file   Other Topics Concern   • Not on file   Social History Narrative   • Not on file     Social Determinants of Health     Financial Resource Strain:    • Difficulty of Paying Living Expenses: Not on file   Food Insecurity:    • Worried About Running Out of Food in the Last Year: Not on file   • Ran Out of Food in the Last Year: Not on file   Transportation Needs:    • Lack of Transportation (Medical): Not on file   • Lack of Transportation (Non-Medical): Not on file   Physical Activity:    • Days of Exercise per Week: Not on file   • Minutes of Exercise per Session: Not on file   Stress:    • Feeling of Stress : Not on file   Social Connections:    • Frequency of Communication with Friends and Family: Not on file   • Frequency of Social Gatherings with Friends and Family: Not on file   • Attends Jewish Services: Not on file   • Active Member of Clubs or Organizations: Not on file   • Attends Club or Organization Meetings: Not on file   • Marital Status: Not on file   Intimate Partner Violence:    • Fear of Current or Ex-Partner: Not on file   • Emotionally Abused: Not on file   • Physically Abused: Not on file   • Sexually Abused: Not on file   Housing Stability:    • Unable to Pay for Housing in the Last Year: Not on file   • Number of Places Lived in the Last Year: Not on file   • Unstable Housing in the Last Year: Not on file      No Known Allergies  Outpatient Encounter Medications as of 11/19/2021   Medication Sig Dispense Refill   • lisinopril (PRINIVIL) 20 MG Tab TAKE 1 TABLET BY MOUTH ONCE DAILY 90 tablet 3   • diphenhydrAMINE-APAP, sleep, (TYLENOL PM EXTRA STRENGTH)  MG Tab Take 1 tablet by mouth every bedtime.     • acetaminophen (TYLENOL) 500 MG Tab Take 500 mg by mouth every 6 hours as needed.     • triamcinolone acetonide (KENALOG) 0.1 % Cream Apply 1 Application topically as needed.     • amLODIPine (NORVASC) 10 MG Tab Take 10 mg by mouth every day.     • [DISCONTINUED] Bromfenac Sodium (BROMSITE) 0.075 % Solution BromSite 0.075 % eye drops (Patient not taking: Reported on 11/19/2021)     • [DISCONTINUED] BROMSITE 0.075 % Solution  (Patient not taking: Reported on 11/19/2021)     • [DISCONTINUED] nitrofurantoin (MACRODANTIN) 50 MG Cap nitrofurantoin macrocrystal 50 mg capsule (Patient not taking: Reported on 11/19/2021)     • amLODIPine (NORVASC) 5 MG Tab Take 1 tablet by mouth every day. 90 tablet 4   • [DISCONTINUED] oxybutynin SR (DITROPAN-XL) 10 MG CR tablet Take 1 tablet by mouth 1 time a day as needed. (Patient not taking: Reported on 11/19/2021) 21 tablet 01     No facility-administered encounter medications on file as of 11/19/2021.     Review of Systems   Constitutional: Negative for chills, fever, malaise/fatigue and weight loss.   HENT: Negative for ear discharge, ear pain, hearing loss and nosebleeds.    Eyes: Negative for blurred vision, double vision, pain and discharge.   Respiratory: Negative for cough and shortness of breath.    Cardiovascular: Negative for chest pain, palpitations, orthopnea, claudication, leg swelling and PND.   Gastrointestinal: Negative for abdominal pain, blood in stool, melena, nausea and vomiting.   Genitourinary: Negative for dysuria and hematuria.   Musculoskeletal: Negative for falls, joint pain and myalgias.   Skin: Negative for itching and rash.   Neurological: Negative for  "dizziness, sensory change, speech change, loss of consciousness and headaches.   Endo/Heme/Allergies: Negative for environmental allergies. Does not bruise/bleed easily.   Psychiatric/Behavioral: Negative for depression, hallucinations and suicidal ideas.        Objective:   /76 (BP Location: Left arm, Patient Position: Sitting, BP Cuff Size: Adult)   Pulse 62   Resp 16   Ht 1.575 m (5' 2\")   Wt 68 kg (150 lb)   SpO2 96%   BMI 27.44 kg/m²     Physical Exam  Vitals and nursing note reviewed.   Constitutional:       General: She is not in acute distress.     Appearance: She is not diaphoretic.   HENT:      Head: Normocephalic and atraumatic.      Right Ear: External ear normal.      Left Ear: External ear normal.   Eyes:      General:         Right eye: No discharge.         Left eye: No discharge.   Neck:      Thyroid: No thyromegaly.      Vascular: No JVD.   Cardiovascular:      Rate and Rhythm: Normal rate and regular rhythm.      Heart sounds: Normal heart sounds. No murmur heard.  No friction rub. No gallop.    Pulmonary:      Effort: No respiratory distress.      Breath sounds: Normal breath sounds.   Abdominal:      General: Bowel sounds are normal. There is no distension.      Tenderness: There is no abdominal tenderness.   Musculoskeletal:         General: No tenderness.   Skin:     General: Skin is warm and dry.   Neurological:      Mental Status: She is alert and oriented to person, place, and time.      Cranial Nerves: No cranial nerve deficit.   Psychiatric:         Behavior: Behavior normal.         Assessment:     1. PAC (premature atrial contraction)     2. PVC (premature ventricular contraction)     3. HTN (hypertension), malignant     4. Palpitations     5. SVT (supraventricular tachycardia) ([facility])     6. CKD (chronic kidney disease) stage 4, GFR 15-29 ml/min ([facility])     7. High risk medication use     8. Mild aortic regurgitation         Medical Decision Making:  Today's Assessment / Status " / Plan:   At this time patient is clinically stable in terms of her cardiac standpoint.  Blood pressure is not well controlled.  Continue amlodipine 10 mg daily and Lisinopril 20 mg daily.  Clinically monitor for progression of aortic regurgitation.       I will see patient back in clinic with lab tests and studies results in 3 months.     I thank you Dr. Lan for referring patient to our Cardiology Clinic today.

## 2021-12-03 ENCOUNTER — HOSPITAL ENCOUNTER (OUTPATIENT)
Dept: RADIOLOGY | Facility: MEDICAL CENTER | Age: 76
End: 2021-12-03
Attending: UROLOGY
Payer: MEDICARE

## 2021-12-03 VITALS — WEIGHT: 147.05 LBS | BODY MASS INDEX: 27.06 KG/M2 | HEIGHT: 62 IN

## 2021-12-03 DIAGNOSIS — C66.9 MALIGNANT NEOPLASM OF URETER, UNSPECIFIED LATERALITY (HCC): ICD-10-CM

## 2021-12-03 LAB
BUN BLD-MCNC: 27 MG/DL (ref 8–22)
CA-I BLD ISE-SCNC: 1.34 MMOL/L (ref 1.1–1.3)
CHLORIDE BLD-SCNC: 109 MMOL/L (ref 96–112)
CO2 BLD-SCNC: 22 MMOL/L (ref 20–33)
CREAT BLD-MCNC: 1.4 MG/DL (ref 0.5–1.4)
GLUCOSE BLD-MCNC: 93 MG/DL (ref 65–99)
HCT VFR BLD CALC: 38 % (ref 37–47)
HGB BLD-MCNC: 12.9 G/DL (ref 12–16)
POTASSIUM BLD-SCNC: 4.3 MMOL/L (ref 3.6–5.5)
SODIUM BLD-SCNC: 142 MMOL/L (ref 135–145)

## 2021-12-03 PROCEDURE — 74183 MRI ABD W/O CNTR FLWD CNTR: CPT | Mod: MH

## 2021-12-03 PROCEDURE — 85014 HEMATOCRIT: CPT

## 2021-12-03 PROCEDURE — A9576 INJ PROHANCE MULTIPACK: HCPCS | Performed by: UROLOGY

## 2021-12-03 PROCEDURE — 700117 HCHG RX CONTRAST REV CODE 255: Performed by: UROLOGY

## 2021-12-03 PROCEDURE — 72197 MRI PELVIS W/O & W/DYE: CPT | Mod: MH

## 2021-12-03 PROCEDURE — 700105 HCHG RX REV CODE 258: Performed by: RADIOLOGY

## 2021-12-03 PROCEDURE — 80047 BASIC METABLC PNL IONIZED CA: CPT

## 2021-12-03 PROCEDURE — 700111 HCHG RX REV CODE 636 W/ 250 OVERRIDE (IP): Performed by: RADIOLOGY

## 2021-12-03 RX ORDER — SODIUM CHLORIDE 9 MG/ML
670 INJECTION, SOLUTION INTRAVENOUS ONCE
Status: COMPLETED | OUTPATIENT
Start: 2021-12-03 | End: 2021-12-03

## 2021-12-03 RX ORDER — FUROSEMIDE 10 MG/ML
20 INJECTION INTRAMUSCULAR; INTRAVENOUS
Status: COMPLETED | OUTPATIENT
Start: 2021-12-03 | End: 2021-12-03

## 2021-12-03 RX ADMIN — FUROSEMIDE 20 MG: 20 INJECTION, SOLUTION INTRAMUSCULAR; INTRAVENOUS at 09:41

## 2021-12-03 RX ADMIN — SODIUM CHLORIDE 670 ML: 9 INJECTION, SOLUTION INTRAVENOUS at 08:42

## 2021-12-03 RX ADMIN — GADOTERIDOL 14 ML: 279.3 INJECTION, SOLUTION INTRAVENOUS at 11:00

## 2021-12-03 ASSESSMENT — FIBROSIS 4 INDEX: FIB4 SCORE: 1.83

## 2022-06-15 ENCOUNTER — HOSPITAL ENCOUNTER (OUTPATIENT)
Dept: LAB | Facility: MEDICAL CENTER | Age: 77
End: 2022-06-15
Attending: FAMILY MEDICINE
Payer: MEDICARE

## 2022-06-15 LAB
ALBUMIN SERPL BCP-MCNC: 4.4 G/DL (ref 3.2–4.9)
ALBUMIN/GLOB SERPL: 1.6 G/DL
ALP SERPL-CCNC: 82 U/L (ref 30–99)
ALT SERPL-CCNC: 11 U/L (ref 2–50)
ANION GAP SERPL CALC-SCNC: 12 MMOL/L (ref 7–16)
AST SERPL-CCNC: 13 U/L (ref 12–45)
BASOPHILS # BLD AUTO: 0.8 % (ref 0–1.8)
BASOPHILS # BLD: 0.03 K/UL (ref 0–0.12)
BILIRUB SERPL-MCNC: 0.5 MG/DL (ref 0.1–1.5)
BUN SERPL-MCNC: 20 MG/DL (ref 8–22)
CALCIUM SERPL-MCNC: 9.7 MG/DL (ref 8.5–10.5)
CHLORIDE SERPL-SCNC: 111 MMOL/L (ref 96–112)
CHOLEST SERPL-MCNC: 178 MG/DL (ref 100–199)
CO2 SERPL-SCNC: 22 MMOL/L (ref 20–33)
CREAT SERPL-MCNC: 1.27 MG/DL (ref 0.5–1.4)
EOSINOPHIL # BLD AUTO: 0.19 K/UL (ref 0–0.51)
EOSINOPHIL NFR BLD: 5.3 % (ref 0–6.9)
ERYTHROCYTE [DISTWIDTH] IN BLOOD BY AUTOMATED COUNT: 46.7 FL (ref 35.9–50)
FASTING STATUS PATIENT QL REPORTED: NORMAL
GFR SERPLBLD CREATININE-BSD FMLA CKD-EPI: 43 ML/MIN/1.73 M 2
GLOBULIN SER CALC-MCNC: 2.7 G/DL (ref 1.9–3.5)
GLUCOSE SERPL-MCNC: 90 MG/DL (ref 65–99)
HCT VFR BLD AUTO: 41.6 % (ref 37–47)
HDLC SERPL-MCNC: 55 MG/DL
HGB BLD-MCNC: 13.4 G/DL (ref 12–16)
IMM GRANULOCYTES # BLD AUTO: 0.01 K/UL (ref 0–0.11)
IMM GRANULOCYTES NFR BLD AUTO: 0.3 % (ref 0–0.9)
IRON SERPL-MCNC: 103 UG/DL (ref 40–170)
LDLC SERPL CALC-MCNC: 99 MG/DL
LYMPHOCYTES # BLD AUTO: 0.62 K/UL (ref 1–4.8)
LYMPHOCYTES NFR BLD: 17.2 % (ref 22–41)
MCH RBC QN AUTO: 30.9 PG (ref 27–33)
MCHC RBC AUTO-ENTMCNC: 32.2 G/DL (ref 33.6–35)
MCV RBC AUTO: 96.1 FL (ref 81.4–97.8)
MONOCYTES # BLD AUTO: 0.27 K/UL (ref 0–0.85)
MONOCYTES NFR BLD AUTO: 7.5 % (ref 0–13.4)
NEUTROPHILS # BLD AUTO: 2.49 K/UL (ref 2–7.15)
NEUTROPHILS NFR BLD: 68.9 % (ref 44–72)
NRBC # BLD AUTO: 0 K/UL
NRBC BLD-RTO: 0 /100 WBC
PLATELET # BLD AUTO: 191 K/UL (ref 164–446)
PMV BLD AUTO: 10.1 FL (ref 9–12.9)
POTASSIUM SERPL-SCNC: 5.6 MMOL/L (ref 3.6–5.5)
PROT SERPL-MCNC: 7.1 G/DL (ref 6–8.2)
RBC # BLD AUTO: 4.33 M/UL (ref 4.2–5.4)
SODIUM SERPL-SCNC: 145 MMOL/L (ref 135–145)
TRIGL SERPL-MCNC: 121 MG/DL (ref 0–149)
TSH SERPL DL<=0.005 MIU/L-ACNC: 1.61 UIU/ML (ref 0.38–5.33)
WBC # BLD AUTO: 3.6 K/UL (ref 4.8–10.8)

## 2022-06-15 PROCEDURE — 84443 ASSAY THYROID STIM HORMONE: CPT

## 2022-06-15 PROCEDURE — 85025 COMPLETE CBC W/AUTO DIFF WBC: CPT

## 2022-06-15 PROCEDURE — 80061 LIPID PANEL: CPT

## 2022-06-15 PROCEDURE — 80053 COMPREHEN METABOLIC PANEL: CPT

## 2022-06-15 PROCEDURE — 83540 ASSAY OF IRON: CPT

## 2022-06-15 PROCEDURE — 36415 COLL VENOUS BLD VENIPUNCTURE: CPT

## 2022-09-07 ENCOUNTER — HOSPITAL ENCOUNTER (OUTPATIENT)
Dept: RADIOLOGY | Facility: MEDICAL CENTER | Age: 77
End: 2022-09-07
Attending: UROLOGY
Payer: MEDICARE

## 2022-09-27 ENCOUNTER — OFFICE VISIT (OUTPATIENT)
Dept: CARDIOLOGY | Facility: MEDICAL CENTER | Age: 77
End: 2022-09-27
Payer: MEDICARE

## 2022-09-27 VITALS
HEIGHT: 61 IN | BODY MASS INDEX: 28.7 KG/M2 | HEART RATE: 82 BPM | WEIGHT: 152 LBS | DIASTOLIC BLOOD PRESSURE: 72 MMHG | RESPIRATION RATE: 16 BRPM | OXYGEN SATURATION: 97 % | SYSTOLIC BLOOD PRESSURE: 130 MMHG

## 2022-09-27 DIAGNOSIS — I47.10 SVT (SUPRAVENTRICULAR TACHYCARDIA) (HCC): ICD-10-CM

## 2022-09-27 DIAGNOSIS — I35.1 MILD AORTIC REGURGITATION: ICD-10-CM

## 2022-09-27 DIAGNOSIS — I10 ESSENTIAL HYPERTENSION: ICD-10-CM

## 2022-09-27 DIAGNOSIS — I49.1 PAC (PREMATURE ATRIAL CONTRACTION): ICD-10-CM

## 2022-09-27 DIAGNOSIS — I49.3 PVC (PREMATURE VENTRICULAR CONTRACTION): ICD-10-CM

## 2022-09-27 PROCEDURE — 99214 OFFICE O/P EST MOD 30 MIN: CPT | Performed by: INTERNAL MEDICINE

## 2022-09-27 RX ORDER — AMLODIPINE BESYLATE 5 MG/1
10 TABLET ORAL DAILY
Qty: 180 TABLET | Refills: 3 | Status: SHIPPED | OUTPATIENT
Start: 2022-09-27 | End: 2023-09-06 | Stop reason: SDUPTHER

## 2022-09-27 RX ORDER — LISINOPRIL 20 MG/1
20 TABLET ORAL DAILY
Qty: 90 TABLET | Refills: 3 | Status: SHIPPED | OUTPATIENT
Start: 2022-09-27 | End: 2023-09-06 | Stop reason: SDUPTHER

## 2022-09-27 ASSESSMENT — FIBROSIS 4 INDEX: FIB4 SCORE: 1.6

## 2022-09-27 NOTE — PROGRESS NOTES
"Chief Complaint   Patient presents with    Premature Atrial Contractions (PACs)    Premature Ventricular Contractions (PVCs)    Hypertension       Subjective     Zo Sanders is a 77 y.o. female who presents today for follow-up on history of PVCs PACs with history of hypertension    She has been doing well in the past year follows closely with her urology team    Past Medical History:   Diagnosis Date    Arrhythmia     \"too fast\"    Cancer (HCC) 10/2020    uretral    Cataract 2021    no surgery yet    Hypertension      Past Surgical History:   Procedure Laterality Date    KS LAP,PELVIC LYMPHADENECTOMY Left 3/30/2021    Procedure: LYMPHADENECTOMY, ROBOT-ASSISTED, USING DA GERALD XI - PELVIC AND INSTILLATION OF INTRAVESICAL CHEMOTHERAPY(GEMCITABINE);  Surgeon: Mango Gardner M.D.;  Location: [facility];  Service: Uro Robotic    NEPHROURETERECTOMY ROBOTIC XI Left 3/30/2021    Procedure: NEPHROURETERECTOMY, ROBOT-ASSISTED, USING DA GERALD XI;  Surgeon: Mango Gardner M.D.;  Location: [facility];  Service: Uro Robotic    KS CYSTOSCOPY,INSERT URETERAL STENT Left 2021    Procedure: CYSTOSCOPY, WITH LEFT RETROGRADE URETEROPYELOGRAM;  Surgeon: Mango Gardner M.D.;  [facility]: [facility];  Service: Urology    KS CYSTO/URETERO/PYELOSCOPY, DX Left 2021    Procedure: LEFT URETEROSCOPY - WITH LEFT URETERAL MASS BIOPSY;  Surgeon: Mango Gardner M.D.;  Location: [facility];  Service: Urology    HYSTERECTOMY, TOTAL ABDOMINAL  1997     Family History   Problem Relation Age of Onset    Heart Disease Mother      Social History     Socioeconomic History    Marital status:      Spouse name: Not on file    Number of children: Not on file    Years of education: Not on file    Highest education level: Not on file   Occupational History    Not on file   Tobacco Use    Smoking status: Former     Types: Cigarettes     Quit date: 1971     Years since quittin.2    " "Smokeless tobacco: Never   Vaping Use    Vaping Use: Never used   Substance and Sexual Activity    Alcohol use: No    Drug use: No    Sexual activity: Not on file   Other Topics Concern    Not on file   Social History Narrative    Not on file     Social Determinants of Health     Financial Resource Strain: Not on file   Food Insecurity: Not on file   Transportation Needs: Not on file   Physical Activity: Not on file   Stress: Not on file   Social Connections: Not on file   Intimate Partner Violence: Not on file   Housing Stability: Not on file     No Known Allergies  Outpatient Encounter Medications as of 9/27/2022   Medication Sig Dispense Refill    amLODIPine (NORVASC) 5 MG Tab Take 2 Tablets by mouth every day. 180 Tablet 1    lisinopril (PRINIVIL) 20 MG Tab TAKE 1 TABLET BY MOUTH EVERY DAY 90 Tablet 1    diphenhydrAMINE-APAP, sleep, (TYLENOL PM EXTRA STRENGTH)  MG Tab Take 1 tablet by mouth every bedtime.      acetaminophen (TYLENOL) 500 MG Tab Take 500 mg by mouth every 6 hours as needed.      triamcinolone acetonide (KENALOG) 0.1 % Cream Apply 1 Application topically as needed.      [DISCONTINUED] amLODIPine (NORVASC) 10 MG Tab Take 10 mg by mouth every day. (Patient not taking: Reported on 9/27/2022)       No facility-administered encounter medications on file as of 9/27/2022.     ROS           Objective     /72 (BP Location: Left arm, Patient Position: Sitting, BP Cuff Size: Adult)   Pulse 82   Resp 16   Ht 1.549 m (5' 1\")   Wt 68.9 kg (152 lb)   SpO2 97%   BMI 28.72 kg/m²     Physical Exam  Constitutional:       General: She is not in acute distress.     Appearance: She is not diaphoretic.   HENT:      Mouth/Throat:      Comments: Wearing a mask for COVID protocol  Eyes:      General: No scleral icterus.  Neck:      Vascular: No JVD.   Cardiovascular:      Rate and Rhythm: Normal rate.      Heart sounds: Normal heart sounds. No murmur heard.    No friction rub. No gallop.   Pulmonary:      " Effort: No respiratory distress.      Breath sounds: No wheezing or rales.   Abdominal:      General: Bowel sounds are normal.      Palpations: Abdomen is soft.   Musculoskeletal:      Right lower leg: No edema.      Left lower leg: No edema.   Skin:     Findings: No rash.   Neurological:      Mental Status: She is alert. Mental status is at baseline.   Psychiatric:         Mood and Affect: Mood normal.          We reviewed in person the most recent labs  Recent Results (from the past 5040 hour(s))   Lipid Profile    Collection Time: 06/15/22 10:26 AM   Result Value Ref Range    Cholesterol,Tot 178 100 - 199 mg/dL    Triglycerides 121 0 - 149 mg/dL    HDL 55 >=40 mg/dL    LDL 99 <100 mg/dL   IRON    Collection Time: 06/15/22 10:26 AM   Result Value Ref Range    Iron 103 40 - 170 ug/dL   TSH    Collection Time: 06/15/22 10:26 AM   Result Value Ref Range    TSH 1.610 0.380 - 5.330 uIU/mL   CBC WITH DIFFERENTIAL    Collection Time: 06/15/22 10:26 AM   Result Value Ref Range    WBC 3.6 (L) 4.8 - 10.8 K/uL    RBC 4.33 4.20 - 5.40 M/uL    Hemoglobin 13.4 12.0 - 16.0 g/dL    Hematocrit 41.6 37.0 - 47.0 %    MCV 96.1 81.4 - 97.8 fL    MCH 30.9 27.0 - 33.0 pg    MCHC 32.2 (L) 33.6 - 35.0 g/dL    RDW 46.7 35.9 - 50.0 fL    Platelet Count 191 164 - 446 K/uL    MPV 10.1 9.0 - 12.9 fL    Neutrophils-Polys 68.90 44.00 - 72.00 %    Lymphocytes 17.20 (L) 22.00 - 41.00 %    Monocytes 7.50 0.00 - 13.40 %    Eosinophils 5.30 0.00 - 6.90 %    Basophils 0.80 0.00 - 1.80 %    Immature Granulocytes 0.30 0.00 - 0.90 %    Nucleated RBC 0.00 /100 WBC    Neutrophils (Absolute) 2.49 2.00 - 7.15 K/uL    Lymphs (Absolute) 0.62 (L) 1.00 - 4.80 K/uL    Monos (Absolute) 0.27 0.00 - 0.85 K/uL    Eos (Absolute) 0.19 0.00 - 0.51 K/uL    Baso (Absolute) 0.03 0.00 - 0.12 K/uL    Immature Granulocytes (abs) 0.01 0.00 - 0.11 K/uL    NRBC (Absolute) 0.00 K/uL   FASTING STATUS    Collection Time: 06/15/22 10:26 AM   Result Value Ref Range    Fasting Status  Fasting    Comp Metabolic Panel    Collection Time: 06/15/22 10:26 AM   Result Value Ref Range    Sodium 145 135 - 145 mmol/L    Potassium 5.6 (H) 3.6 - 5.5 mmol/L    Chloride 111 96 - 112 mmol/L    Co2 22 20 - 33 mmol/L    Anion Gap 12.0 7.0 - 16.0    Glucose 90 65 - 99 mg/dL    Bun 20 8 - 22 mg/dL    Creatinine 1.27 0.50 - 1.40 mg/dL    Calcium 9.7 8.5 - 10.5 mg/dL    AST(SGOT) 13 12 - 45 U/L    ALT(SGPT) 11 2 - 50 U/L    Alkaline Phosphatase 82 30 - 99 U/L    Total Bilirubin 0.5 0.1 - 1.5 mg/dL    Albumin 4.4 3.2 - 4.9 g/dL    Total Protein 7.1 6.0 - 8.2 g/dL    Globulin 2.7 1.9 - 3.5 g/dL    A-G Ratio 1.6 g/dL   ESTIMATED GFR    Collection Time: 06/15/22 10:26 AM   Result Value Ref Range    GFR (CKD-EPI) 43 (A) >60 mL/min/1.73 m 2     We reviewed the images and her echocardiogram    Assessment & Plan     1. SVT (supraventricular tachycardia) (HCC)  lisinopril (PRINIVIL) 20 MG Tab      2. Essential hypertension  lisinopril (PRINIVIL) 20 MG Tab      3. PAC (premature atrial contraction)        4. PVC (premature ventricular contraction)        5. Mild aortic regurgitation            Medical Decision Making: Today's Assessment/Status/Plan:        It was my pleasure to meet with Ms. Sanders.      Blood pressure is well controlled.  She will continue to monitor and eat hearty healthy diet.    Doing well on her palpitations    I will see Ms. Sanders back in 1 year time and encouraged her to follow up with us over the phone or electronically using my MyChart as issues arise.    It is my pleasure to participate in the care of Ms. Sanders.  Please do not hesitate to contact me with questions or concerns.    Chun Palma MD PhD [facility]  Cardiologist [facility]    Please note that this dictation was created using voice recognition software. There may be errors I did not discover before finalizing the note.

## 2022-09-28 ENCOUNTER — TELEPHONE (OUTPATIENT)
Dept: CARDIOLOGY | Facility: MEDICAL CENTER | Age: 77
End: 2022-09-28
Payer: MEDICARE

## 2022-09-28 NOTE — TELEPHONE ENCOUNTER
Office visit note DOS:09/27/2022 from  printed and faxed over to PCP Jani Lan Fax #: [phone] per patients request. Fax confirmation received and sent to [employer] for scanning.

## 2022-10-31 ENCOUNTER — HOSPITAL ENCOUNTER (OUTPATIENT)
Dept: RADIOLOGY | Facility: MEDICAL CENTER | Age: 77
End: 2022-10-31
Attending: UROLOGY
Payer: MEDICARE

## 2022-10-31 VITALS — WEIGHT: 153 LBS | BODY MASS INDEX: 28.91 KG/M2

## 2022-10-31 DIAGNOSIS — Z85.54 HISTORY OF MALIGNANT NEOPLASM OF URETER: ICD-10-CM

## 2022-10-31 LAB
BUN BLD-MCNC: 27 MG/DL (ref 8–22)
CA-I BLD ISE-SCNC: 1.3 MMOL/L (ref 1.1–1.3)
CHLORIDE BLD-SCNC: 110 MMOL/L (ref 96–112)
CO2 BLD-SCNC: 20 MMOL/L (ref 20–33)
CREAT BLD-MCNC: 1.6 MG/DL (ref 0.5–1.4)
GLUCOSE BLD-MCNC: 109 MG/DL (ref 65–99)
HCT VFR BLD CALC: 42 % (ref 37–47)
HGB BLD-MCNC: 14.3 G/DL (ref 12–16)
POTASSIUM BLD-SCNC: 4.5 MMOL/L (ref 3.6–5.5)
SODIUM BLD-SCNC: 141 MMOL/L (ref 135–145)

## 2022-10-31 PROCEDURE — 74183 MRI ABD W/O CNTR FLWD CNTR: CPT

## 2022-10-31 PROCEDURE — 700105 HCHG RX REV CODE 258: Performed by: RADIOLOGY

## 2022-10-31 PROCEDURE — 85014 HEMATOCRIT: CPT

## 2022-10-31 PROCEDURE — 700111 HCHG RX REV CODE 636 W/ 250 OVERRIDE (IP): Performed by: RADIOLOGY

## 2022-10-31 PROCEDURE — 72197 MRI PELVIS W/O & W/DYE: CPT

## 2022-10-31 PROCEDURE — 80047 BASIC METABLC PNL IONIZED CA: CPT

## 2022-10-31 PROCEDURE — A9576 INJ PROHANCE MULTIPACK: HCPCS | Performed by: UROLOGY

## 2022-10-31 PROCEDURE — 700117 HCHG RX CONTRAST REV CODE 255: Performed by: UROLOGY

## 2022-10-31 RX ORDER — SODIUM CHLORIDE 9 MG/ML
690 INJECTION, SOLUTION INTRAVENOUS ONCE
Status: COMPLETED | OUTPATIENT
Start: 2022-10-31 | End: 2022-10-31

## 2022-10-31 RX ORDER — FUROSEMIDE 10 MG/ML
20 INJECTION INTRAMUSCULAR; INTRAVENOUS
Status: COMPLETED | OUTPATIENT
Start: 2022-10-31 | End: 2022-10-31

## 2022-10-31 RX ADMIN — SODIUM CHLORIDE 690 ML: 9 INJECTION, SOLUTION INTRAVENOUS at 12:54

## 2022-10-31 RX ADMIN — FUROSEMIDE 20 MG: 20 INJECTION, SOLUTION INTRAMUSCULAR; INTRAVENOUS at 13:59

## 2022-10-31 RX ADMIN — GADOTERIDOL 15 ML: 279.3 INJECTION, SOLUTION INTRAVENOUS at 14:30

## 2022-10-31 ASSESSMENT — FIBROSIS 4 INDEX: FIB4 SCORE: 1.6

## 2022-11-03 ENCOUNTER — PATIENT MESSAGE (OUTPATIENT)
Dept: HEALTH INFORMATION MANAGEMENT | Facility: OTHER | Age: 77
End: 2022-11-03

## 2023-03-19 ENCOUNTER — OFFICE VISIT (OUTPATIENT)
Dept: URGENT CARE | Facility: PHYSICIAN GROUP | Age: 78
End: 2023-03-19
Payer: MEDICARE

## 2023-03-19 ENCOUNTER — HOSPITAL ENCOUNTER (OUTPATIENT)
Facility: MEDICAL CENTER | Age: 78
End: 2023-03-19
Attending: NURSE PRACTITIONER
Payer: MEDICARE

## 2023-03-19 VITALS
HEIGHT: 61 IN | BODY MASS INDEX: 27 KG/M2 | WEIGHT: 143 LBS | OXYGEN SATURATION: 97 % | DIASTOLIC BLOOD PRESSURE: 60 MMHG | TEMPERATURE: 97.1 F | HEART RATE: 75 BPM | SYSTOLIC BLOOD PRESSURE: 124 MMHG | RESPIRATION RATE: 18 BRPM

## 2023-03-19 DIAGNOSIS — N30.00 ACUTE CYSTITIS WITHOUT HEMATURIA: ICD-10-CM

## 2023-03-19 LAB
APPEARANCE UR: NORMAL
BILIRUB UR STRIP-MCNC: NORMAL MG/DL
COLOR UR AUTO: NORMAL
GLUCOSE UR STRIP.AUTO-MCNC: NORMAL MG/DL
KETONES UR STRIP.AUTO-MCNC: NORMAL MG/DL
LEUKOCYTE ESTERASE UR QL STRIP.AUTO: NORMAL
NITRITE UR QL STRIP.AUTO: NORMAL
PH UR STRIP.AUTO: 5.5 [PH] (ref 5–8)
PROT UR QL STRIP: NORMAL MG/DL
RBC UR QL AUTO: NORMAL
SP GR UR STRIP.AUTO: <=1.005
UROBILINOGEN UR STRIP-MCNC: 0.2 MG/DL

## 2023-03-19 PROCEDURE — 81002 URINALYSIS NONAUTO W/O SCOPE: CPT | Performed by: NURSE PRACTITIONER

## 2023-03-19 PROCEDURE — 87077 CULTURE AEROBIC IDENTIFY: CPT

## 2023-03-19 PROCEDURE — 99213 OFFICE O/P EST LOW 20 MIN: CPT | Performed by: NURSE PRACTITIONER

## 2023-03-19 PROCEDURE — 87186 SC STD MICRODIL/AGAR DIL: CPT

## 2023-03-19 PROCEDURE — 87086 URINE CULTURE/COLONY COUNT: CPT

## 2023-03-19 RX ORDER — NITROFURANTOIN 25; 75 MG/1; MG/1
100 CAPSULE ORAL 2 TIMES DAILY
Qty: 10 CAPSULE | Refills: 0 | Status: SHIPPED | OUTPATIENT
Start: 2023-03-19 | End: 2023-03-24

## 2023-03-19 ASSESSMENT — FIBROSIS 4 INDEX: FIB4 SCORE: 1.6

## 2023-03-20 ASSESSMENT — ENCOUNTER SYMPTOMS
FEVER: 0
RESPIRATORY NEGATIVE: 1
EYES NEGATIVE: 1
GASTROINTESTINAL NEGATIVE: 1
CONSTITUTIONAL NEGATIVE: 1
CARDIOVASCULAR NEGATIVE: 1

## 2023-03-20 NOTE — PROGRESS NOTES
"Subjective:   Zo Sanders is a 77 y.o. female who presents for UTI (X 1 wk abdominal pressure, irritation when urinating)      UTI  This is a new problem. Episode onset: One week. The problem occurs constantly. The problem has been gradually worsening. Associated symptoms include urinary symptoms. Pertinent negatives include no fever. Nothing aggravates the symptoms. She has tried NSAIDs for the symptoms. The treatment provided mild relief.     Review of Systems   Constitutional: Negative.  Negative for fever.   HENT: Negative.     Eyes: Negative.    Respiratory: Negative.     Cardiovascular: Negative.    Gastrointestinal: Negative.    Genitourinary:         Abdominal pressure while urinating and irritation - Patient does not get dysuria any longer with UTI   Skin: Negative.      Medications, Allergies, and current problem list reviewed today in Epic.     Objective:     /60 (BP Location: Left arm, Patient Position: Sitting, BP Cuff Size: Adult)   Pulse 75   Temp 36.2 °C (97.1 °F) (Temporal)   Resp 18   Ht 1.549 m (5' 1\")   Wt 64.9 kg (143 lb)   SpO2 97%     Physical Exam  Vitals reviewed.   Constitutional:       Appearance: Normal appearance.   HENT:      Head: Normocephalic and atraumatic.      Nose: Nose normal.      Mouth/Throat:      Mouth: Mucous membranes are moist.      Pharynx: Oropharynx is clear.   Eyes:      Extraocular Movements: Extraocular movements intact.      Conjunctiva/sclera: Conjunctivae normal.      Pupils: Pupils are equal, round, and reactive to light.   Cardiovascular:      Rate and Rhythm: Normal rate and regular rhythm.   Pulmonary:      Effort: Pulmonary effort is normal.      Breath sounds: Normal breath sounds.   Abdominal:      General: Abdomen is flat.      Palpations: Abdomen is soft.   Musculoskeletal:         General: Normal range of motion.      Cervical back: Normal range of motion and neck supple.   Skin:     General: Skin is warm and dry.      Capillary " Refill: Capillary refill takes less than 2 seconds.   Neurological:      General: No focal deficit present.      Mental Status: She is alert.   Psychiatric:         Mood and Affect: Mood normal.         Behavior: Behavior normal.       Assessment/Plan:     Diagnosis and associated orders:     1. Acute cystitis without hematuria  nitrofurantoin (MACROBID) 100 MG Cap    URINE CULTURE(NEW)    POCT Urinalysis         Comments/MDM:     The patient's presenting symptoms and exam findings are consistent with a simple urinary tract infection. They are overall very well-appearing with normal vital signs and benign examination findings.   Increase fluid intake.  Urine culture: will call back only if positive and if necessary change in therapy.   Advised to return to the Urgent Care or follow up with their PCP if symptoms are not improving in 2-3 days or sooner if any worsening symptoms such as fever, chills, abdominal pain, back/flank pain, nausea, vomiting, or any other concerns.              Differential diagnosis, natural history, supportive care, and indications for immediate follow-up discussed.    Advised the patient to follow-up with the primary care physician for recheck, reevaluation, and consideration of further management.    Please note that this dictation was created using voice recognition software. I have made a reasonable attempt to correct obvious errors, but I expect that there are errors of grammar and possibly content that I did not discover before finalizing the note.    This note was electronically signed by GLADIS Mercer

## 2023-04-04 ENCOUNTER — HOSPITAL ENCOUNTER (OUTPATIENT)
Facility: MEDICAL CENTER | Age: 78
End: 2023-04-04
Attending: PHYSICIAN ASSISTANT
Payer: MEDICARE

## 2023-04-04 PROCEDURE — 87086 URINE CULTURE/COLONY COUNT: CPT

## 2023-04-04 PROCEDURE — 87077 CULTURE AEROBIC IDENTIFY: CPT

## 2023-04-04 PROCEDURE — 87186 SC STD MICRODIL/AGAR DIL: CPT

## 2023-04-18 ENCOUNTER — HOSPITAL ENCOUNTER (OUTPATIENT)
Facility: MEDICAL CENTER | Age: 78
End: 2023-04-18
Attending: PHYSICIAN ASSISTANT
Payer: MEDICARE

## 2023-04-18 PROCEDURE — 87086 URINE CULTURE/COLONY COUNT: CPT

## 2023-04-18 PROCEDURE — 87077 CULTURE AEROBIC IDENTIFY: CPT

## 2023-04-18 PROCEDURE — 87186 SC STD MICRODIL/AGAR DIL: CPT

## 2023-09-06 ENCOUNTER — OFFICE VISIT (OUTPATIENT)
Dept: CARDIOLOGY | Facility: MEDICAL CENTER | Age: 78
End: 2023-09-06
Attending: INTERNAL MEDICINE
Payer: MEDICARE

## 2023-09-06 VITALS
SYSTOLIC BLOOD PRESSURE: 126 MMHG | DIASTOLIC BLOOD PRESSURE: 66 MMHG | WEIGHT: 143 LBS | OXYGEN SATURATION: 96 % | HEART RATE: 78 BPM | RESPIRATION RATE: 14 BRPM | HEIGHT: 61 IN | BODY MASS INDEX: 27 KG/M2

## 2023-09-06 DIAGNOSIS — I47.10 SVT (SUPRAVENTRICULAR TACHYCARDIA) (HCC): ICD-10-CM

## 2023-09-06 DIAGNOSIS — I10 ESSENTIAL HYPERTENSION: ICD-10-CM

## 2023-09-06 DIAGNOSIS — I49.3 PVC (PREMATURE VENTRICULAR CONTRACTION): ICD-10-CM

## 2023-09-06 DIAGNOSIS — I35.1 MILD AORTIC REGURGITATION: ICD-10-CM

## 2023-09-06 DIAGNOSIS — I49.1 PAC (PREMATURE ATRIAL CONTRACTION): ICD-10-CM

## 2023-09-06 PROCEDURE — 99211 OFF/OP EST MAY X REQ PHY/QHP: CPT | Performed by: INTERNAL MEDICINE

## 2023-09-06 PROCEDURE — 99212 OFFICE O/P EST SF 10 MIN: CPT | Performed by: INTERNAL MEDICINE

## 2023-09-06 PROCEDURE — 99214 OFFICE O/P EST MOD 30 MIN: CPT | Performed by: INTERNAL MEDICINE

## 2023-09-06 PROCEDURE — 3078F DIAST BP <80 MM HG: CPT | Performed by: INTERNAL MEDICINE

## 2023-09-06 PROCEDURE — 3074F SYST BP LT 130 MM HG: CPT | Performed by: INTERNAL MEDICINE

## 2023-09-06 RX ORDER — AMLODIPINE BESYLATE 10 MG/1
10 TABLET ORAL DAILY
Qty: 90 TABLET | Refills: 3 | Status: SHIPPED | OUTPATIENT
Start: 2023-09-06

## 2023-09-06 RX ORDER — AMLODIPINE BESYLATE 5 MG/1
10 TABLET ORAL DAILY
Qty: 180 TABLET | Refills: 3 | Status: SHIPPED | OUTPATIENT
Start: 2023-09-06 | End: 2023-09-06 | Stop reason: SDUPTHER

## 2023-09-06 RX ORDER — LISINOPRIL 20 MG/1
20 TABLET ORAL DAILY
Qty: 90 TABLET | Refills: 3 | Status: SHIPPED | OUTPATIENT
Start: 2023-09-06

## 2023-09-06 ASSESSMENT — FIBROSIS 4 INDEX: FIB4 SCORE: 1.62

## 2023-09-06 NOTE — PROGRESS NOTES
"Chief Complaint   Patient presents with    Paroxysmal Supraventricular Tachycardia (PSVT)    Hypertension       Subjective     Zo Sanders is a 78 y.o. female who presents today for follow-up on history of PVCs PACs with history of hypertension    Past Medical History:   Diagnosis Date    Arrhythmia     \"too fast\"    Cancer (HCC) 10/2020    uretral    Cataract 2021    no surgery yet    Hypertension      Past Surgical History:   Procedure Laterality Date    MI LAP,PELVIC LYMPHADENECTOMY Left 3/30/2021    Procedure: LYMPHADENECTOMY, ROBOT-ASSISTED, USING DA GERALD XI - PELVIC AND INSTILLATION OF INTRAVESICAL CHEMOTHERAPY(GEMCITABINE);  Surgeon: Mango Gardner M.D.;  Location: [facility];  Service: Uro Robotic    NEPHROURETERECTOMY ROBOTIC XI Left 3/30/2021    Procedure: NEPHROURETERECTOMY, ROBOT-ASSISTED, USING DA GERALD XI;  Surgeon: Mango Gardner M.D.;  Location: [facility];  Service: Uro Robotic    MI CYSTOSCOPY,INSERT URETERAL STENT Left 2021    Procedure: CYSTOSCOPY, WITH LEFT RETROGRADE URETEROPYELOGRAM;  Surgeon: Mango Gardner M.D.;  Location: [facility];  Service: Urology    MI CYSTO/URETERO/PYELOSCOPY, DX Left 2021    Procedure: LEFT URETEROSCOPY - WITH LEFT URETERAL MASS BIOPSY;  Surgeon: Mango Gardner M.D.;  Location: [facility];  Service: Urology    HYSTERECTOMY, TOTAL ABDOMINAL  1997     Family History   Problem Relation Age of Onset    Heart Disease Mother      Social History     Socioeconomic History    Marital status:      Spouse name: Not on file    Number of children: Not on file    Years of education: Not on file    Highest education level: Not on file   Occupational History    Not on file   Tobacco Use    Smoking status: Former     Current packs/day: 0.00     Types: Cigarettes     Quit date: 1971     Years since quittin.1    Smokeless tobacco: Never   Vaping Use    Vaping Use: Never used   Substance and " "Sexual Activity    Alcohol use: No    Drug use: No    Sexual activity: Not on file   Other Topics Concern    Not on file   Social History Narrative    Not on file     Social Determinants of Health     Financial Resource Strain: Not on file   Food Insecurity: Not on file   Transportation Needs: Not on file   Physical Activity: Not on file   Stress: Not on file   Social Connections: Not on file   Intimate Partner Violence: Not on file   Housing Stability: Not on file     No Known Allergies  Outpatient Encounter Medications as of 9/6/2023   Medication Sig Dispense Refill    lisinopril (PRINIVIL) 20 MG Tab Take 1 Tablet by mouth every day. 90 Tablet 3    amLODIPine (NORVASC) 5 MG Tab Take 2 Tablets by mouth every day. 180 Tablet 3    diphenhydrAMINE-APAP, sleep, (TYLENOL PM EXTRA STRENGTH)  MG Tab Take 1 tablet by mouth every bedtime.      triamcinolone acetonide (KENALOG) 0.1 % Cream Apply 1 Application topically as needed.      acetaminophen (TYLENOL) 500 MG Tab Take 500 mg by mouth every 6 hours as needed.       No facility-administered encounter medications on file as of 9/6/2023.     ROS           Objective     /66 (BP Location: Left arm, Patient Position: Sitting, BP Cuff Size: Adult)   Pulse 78   Resp 14   Ht 1.549 m (5' 1\")   Wt 64.9 kg (143 lb)   SpO2 96%   BMI 27.02 kg/m²     Physical Exam  Constitutional:       General: She is not in acute distress.     Appearance: She is not diaphoretic.   Eyes:      General: No scleral icterus.  Neck:      Vascular: No JVD.   Cardiovascular:      Rate and Rhythm: Normal rate.      Heart sounds: Normal heart sounds. No murmur heard.     No friction rub. No gallop.   Pulmonary:      Effort: No respiratory distress.      Breath sounds: No wheezing or rales.   Abdominal:      General: Bowel sounds are normal.      Palpations: Abdomen is soft.   Musculoskeletal:      Right lower leg: No edema.      Left lower leg: No edema.   Skin:     Findings: No rash. "   Neurological:      Mental Status: She is alert. Mental status is at baseline.   Psychiatric:         Mood and Affect: Mood normal.            We reviewed in person the most recent labs  Recent Results (from the past 5040 hour(s))   URINE CULTURE(NEW)    Collection Time: 03/19/23  1:07 PM    Specimen: Urine   Result Value Ref Range    Significant Indicator POS (POS)     Source UR     Site -     Culture Result Usual skin dewayne <10,000 cfu/mL (A)     Culture Result Escherichia coli  10-50,000 cfu/mL   (A)        Susceptibility    Escherichia coli - DAYNA     Ampicillin >16 Resistant mcg/mL     Ceftriaxone <=1 Sensitive mcg/mL     Cefazolin* <=2 Sensitive mcg/mL      * Breakpoints when Cefazolin is used for therapy of infections  other than uncomplicated UTIs due to Enterobacterales are as  follows:  DAYNA and Interpretation:  <=2 S  4 I  >=8 R       Ciprofloxacin >2 Resistant mcg/mL     Cefepime <=2 Sensitive mcg/mL     Cefuroxime <=4 Sensitive mcg/mL     Ampicillin/sulbactam >16/8 Resistant mcg/mL     Tobramycin >8 Resistant mcg/mL     Nitrofurantoin <=32 Sensitive mcg/mL     Gentamicin >8 Resistant mcg/mL     Levofloxacin >4 Resistant mcg/mL     Minocycline <=4 Sensitive mcg/mL     Pip/Tazobactam <=8 Sensitive mcg/mL     Trimeth/Sulfa >2/38 Resistant mcg/mL     Tigecycline <=2 Sensitive mcg/mL   POCT Urinalysis    Collection Time: 03/19/23  1:23 PM   Result Value Ref Range    POC Color YELLLOW Negative    POC Appearance SLIGHTLY CLOUDY Negative    POC Glucose NEG Negative mg/dL    POC Bilirubin NEG Negative mg/dL    POC Ketones NEG Negative mg/dL    POC Specific Gravity <=1.005 <1.005 - >1.030    POC Blood NEG Negative    POC Urine PH 5.5 5.0 - 8.0    POC Protein NEG Negative mg/dL    POC Urobiligen 0.2 Negative (0.2) mg/dL    POC Nitrites NEG Negative    POC Leukocyte Esterase SMALL Negative   URINE CULTURE(NEW)    Collection Time: 04/04/23  3:23 PM    Specimen: Urine   Result Value Ref Range    Significant Indicator  POS (POS)     Source UR     Site -     Culture Result - (A)     Culture Result Citrobacter freundii complex  ,000 cfu/mL   (A)        Susceptibility    Citrobacter freundii complex - DAYNA     Ampicillin <=8 Resistant mcg/mL     Ceftriaxone <=1 Sensitive mcg/mL     Cefazolin* >16 Resistant mcg/mL      * Breakpoints when Cefazolin is used for therapy of infections  other than uncomplicated UTIs due to Enterobacterales are as  follows:  DAYNA and Interpretation:  <=2 S  4 I  >=8 R       Ciprofloxacin <=0.25 Sensitive mcg/mL     Cefepime <=2 Sensitive mcg/mL     Cefuroxime <=4 Resistant mcg/mL     Ampicillin/sulbactam <=4/2 Resistant mcg/mL     Tobramycin <=2 Sensitive mcg/mL     Nitrofurantoin <=32 Sensitive mcg/mL     Gentamicin <=2 Sensitive mcg/mL     Levofloxacin <=0.5 Sensitive mcg/mL     Minocycline <=4 Sensitive mcg/mL     Pip/Tazobactam <=8 Sensitive mcg/mL     Trimeth/Sulfa <=0.5/9.5 Sensitive mcg/mL     Tigecycline <=2 Sensitive mcg/mL   URINE CULTURE(NEW)    Collection Time: 04/18/23 11:00 AM    Specimen: Urine   Result Value Ref Range    Significant Indicator POS (POS)     Source UR     Site -     Culture Result - (A)     Culture Result Escherichia coli  >100,000 cfu/mL   (A)        Susceptibility    Escherichia coli - DAYNA     Ampicillin >16 Resistant mcg/mL     Ceftriaxone <=1 Sensitive mcg/mL     Cefazolin* <=2 Sensitive mcg/mL      * Breakpoints when Cefazolin is used for therapy of infections  other than uncomplicated UTIs due to Enterobacterales are as  follows:  DAYNA and Interpretation:  <=2 S  4 I  >=8 R       Ciprofloxacin >2 Resistant mcg/mL     Cefepime <=2 Sensitive mcg/mL     Cefuroxime <=4 Sensitive mcg/mL     Ampicillin/sulbactam >16/8 Resistant mcg/mL     Tobramycin 8 Intermediate mcg/mL     Nitrofurantoin <=32 Sensitive mcg/mL     Gentamicin >8 Resistant mcg/mL     Levofloxacin >4 Resistant mcg/mL     Minocycline <=4 Sensitive mcg/mL     Pip/Tazobactam <=8 Sensitive mcg/mL      Trimeth/Sulfa >2/38 Resistant mcg/mL     Tigecycline <=2 Sensitive mcg/mL         Assessment & Plan     1. Mild aortic regurgitation        2. Essential hypertension        3. PAC (premature atrial contraction)        4. PVC (premature ventricular contraction)        5. SVT (supraventricular tachycardia) (HCC)            Medical Decision Making: Today's Assessment/Status/Plan:        It was my pleasure to meet with Ms. Sanders.    We addressed the management of hypertension at today's visit. Blood pressure is well controlled.  We specifically assessed the labs on hypertension treatment    Lipids normal     I will see Ms. Sanders back in 1 year time and encouraged her to follow up with us over the phone or electronically using my JamHubhart as issues arise.    It is my pleasure to participate in the care of Ms. Sanders.  Please do not hesitate to contact me with questions or concerns.    Chun Palma MD PhD [facility]  Cardiologist [facility]    Please note that this dictation was created using voice recognition software. There may be errors I did not discover before finalizing the note.

## 2023-09-06 NOTE — PATIENT INSTRUCTIONS
Consider [employer] ([email or web address]/[email or web address]/) $ DO NOT SUBSCRIBE WE WILL ALWAYS REVIEW YOUR TRACINGS ON MYCHART or Smartwatch such as Apple Watch $$$$ for monitoring of the heart palpitations.  This is a small device that syncs to your phone with Bluetooth that can tell you your heart rhythm.  You can send us a screencapture of the tracings with [employer].        Typical Patch Monitor looks like this Calista or Kalyn

## 2023-09-26 ENCOUNTER — HOSPITAL ENCOUNTER (OUTPATIENT)
Dept: LAB | Facility: MEDICAL CENTER | Age: 78
End: 2023-09-26
Attending: INTERNAL MEDICINE
Payer: MEDICARE

## 2023-09-26 ENCOUNTER — HOSPITAL ENCOUNTER (OUTPATIENT)
Dept: LAB | Facility: MEDICAL CENTER | Age: 78
End: 2023-09-26
Attending: FAMILY MEDICINE
Payer: MEDICARE

## 2023-09-26 LAB
ALBUMIN SERPL BCP-MCNC: 4.5 G/DL (ref 3.2–4.9)
ALBUMIN/GLOB SERPL: 1.6 G/DL
ALP SERPL-CCNC: 98 U/L (ref 30–99)
ALT SERPL-CCNC: 12 U/L (ref 2–50)
ANION GAP SERPL CALC-SCNC: 11 MMOL/L (ref 7–16)
AST SERPL-CCNC: 19 U/L (ref 12–45)
BASOPHILS # BLD AUTO: 0.6 % (ref 0–1.8)
BASOPHILS # BLD: 0.02 K/UL (ref 0–0.12)
BILIRUB SERPL-MCNC: 0.5 MG/DL (ref 0.1–1.5)
BUN SERPL-MCNC: 18 MG/DL (ref 8–22)
CALCIUM ALBUM COR SERPL-MCNC: 9.6 MG/DL (ref 8.5–10.5)
CALCIUM SERPL-MCNC: 10 MG/DL (ref 8.5–10.5)
CHLORIDE SERPL-SCNC: 106 MMOL/L (ref 96–112)
CHOLEST SERPL-MCNC: 179 MG/DL (ref 100–199)
CO2 SERPL-SCNC: 22 MMOL/L (ref 20–33)
CREAT SERPL-MCNC: 1.37 MG/DL (ref 0.5–1.4)
EOSINOPHIL # BLD AUTO: 0.25 K/UL (ref 0–0.51)
EOSINOPHIL NFR BLD: 7.4 % (ref 0–6.9)
ERYTHROCYTE [DISTWIDTH] IN BLOOD BY AUTOMATED COUNT: 46.5 FL (ref 35.9–50)
FASTING STATUS PATIENT QL REPORTED: NORMAL
GFR SERPLBLD CREATININE-BSD FMLA CKD-EPI: 39 ML/MIN/1.73 M 2
GLOBULIN SER CALC-MCNC: 2.8 G/DL (ref 1.9–3.5)
GLUCOSE SERPL-MCNC: 91 MG/DL (ref 65–99)
HCT VFR BLD AUTO: 43.4 % (ref 37–47)
HDLC SERPL-MCNC: 54 MG/DL
HGB BLD-MCNC: 13.6 G/DL (ref 12–16)
IMM GRANULOCYTES # BLD AUTO: 0 K/UL (ref 0–0.11)
IMM GRANULOCYTES NFR BLD AUTO: 0 % (ref 0–0.9)
LDLC SERPL CALC-MCNC: 98 MG/DL
LYMPHOCYTES # BLD AUTO: 0.61 K/UL (ref 1–4.8)
LYMPHOCYTES NFR BLD: 18 % (ref 22–41)
MCH RBC QN AUTO: 30.7 PG (ref 27–33)
MCHC RBC AUTO-ENTMCNC: 31.3 G/DL (ref 32.2–35.5)
MCV RBC AUTO: 98 FL (ref 81.4–97.8)
MONOCYTES # BLD AUTO: 0.22 K/UL (ref 0–0.85)
MONOCYTES NFR BLD AUTO: 6.5 % (ref 0–13.4)
NEUTROPHILS # BLD AUTO: 2.29 K/UL (ref 1.82–7.42)
NEUTROPHILS NFR BLD: 67.5 % (ref 44–72)
NRBC # BLD AUTO: 0 K/UL
NRBC BLD-RTO: 0 /100 WBC (ref 0–0.2)
PLATELET # BLD AUTO: 221 K/UL (ref 164–446)
PMV BLD AUTO: 10.2 FL (ref 9–12.9)
POTASSIUM SERPL-SCNC: 5 MMOL/L (ref 3.6–5.5)
PROT SERPL-MCNC: 7.3 G/DL (ref 6–8.2)
RBC # BLD AUTO: 4.43 M/UL (ref 4.2–5.4)
SODIUM SERPL-SCNC: 139 MMOL/L (ref 135–145)
TRIGL SERPL-MCNC: 136 MG/DL (ref 0–149)
TSH SERPL DL<=0.005 MIU/L-ACNC: 1.92 UIU/ML (ref 0.38–5.33)
WBC # BLD AUTO: 3.4 K/UL (ref 4.8–10.8)

## 2023-09-26 PROCEDURE — 85025 COMPLETE CBC W/AUTO DIFF WBC: CPT

## 2023-09-26 PROCEDURE — 36415 COLL VENOUS BLD VENIPUNCTURE: CPT

## 2023-09-26 PROCEDURE — 84443 ASSAY THYROID STIM HORMONE: CPT

## 2023-09-26 PROCEDURE — 80053 COMPREHEN METABOLIC PANEL: CPT

## 2023-09-26 PROCEDURE — 80061 LIPID PANEL: CPT

## 2023-10-23 ENCOUNTER — HOSPITAL ENCOUNTER (OUTPATIENT)
Dept: RADIOLOGY | Facility: MEDICAL CENTER | Age: 78
End: 2023-10-23
Attending: UROLOGY
Payer: MEDICARE

## 2023-10-23 VITALS — WEIGHT: 140 LBS | HEIGHT: 62 IN | BODY MASS INDEX: 25.76 KG/M2

## 2023-10-23 DIAGNOSIS — Z85.54 HISTORY OF MALIGNANT NEOPLASM OF URETER: ICD-10-CM

## 2023-10-23 PROCEDURE — 700111 HCHG RX REV CODE 636 W/ 250 OVERRIDE (IP): Mod: JZ | Performed by: RADIOLOGY

## 2023-10-23 PROCEDURE — 700105 HCHG RX REV CODE 258: Performed by: RADIOLOGY

## 2023-10-23 PROCEDURE — 700117 HCHG RX CONTRAST REV CODE 255: Performed by: UROLOGY

## 2023-10-23 PROCEDURE — A9579 GAD-BASE MR CONTRAST NOS,1ML: HCPCS | Performed by: UROLOGY

## 2023-10-23 PROCEDURE — 72197 MRI PELVIS W/O & W/DYE: CPT

## 2023-10-23 PROCEDURE — 74183 MRI ABD W/O CNTR FLWD CNTR: CPT

## 2023-10-23 RX ORDER — FUROSEMIDE 10 MG/ML
20 INJECTION INTRAMUSCULAR; INTRAVENOUS
Status: COMPLETED | OUTPATIENT
Start: 2023-10-23 | End: 2023-10-23

## 2023-10-23 RX ORDER — SODIUM CHLORIDE 9 MG/ML
635 INJECTION, SOLUTION INTRAVENOUS ONCE
Status: COMPLETED | OUTPATIENT
Start: 2023-10-23 | End: 2023-10-23

## 2023-10-23 RX ADMIN — GADOTERIDOL 13 ML: 279.3 INJECTION, SOLUTION INTRAVENOUS at 12:08

## 2023-10-23 RX ADMIN — FUROSEMIDE 20 MG: 10 INJECTION, SOLUTION INTRAVENOUS at 11:07

## 2023-10-23 RX ADMIN — SODIUM CHLORIDE 635 ML: 9 INJECTION, SOLUTION INTRAVENOUS at 10:05

## 2023-10-23 ASSESSMENT — FIBROSIS 4 INDEX: FIB4 SCORE: 1.96

## 2023-11-07 PROCEDURE — 87086 URINE CULTURE/COLONY COUNT: CPT

## 2023-11-07 PROCEDURE — 87186 SC STD MICRODIL/AGAR DIL: CPT

## 2023-11-07 PROCEDURE — 87077 CULTURE AEROBIC IDENTIFY: CPT

## 2023-11-25 ENCOUNTER — OFFICE VISIT (OUTPATIENT)
Dept: URGENT CARE | Facility: PHYSICIAN GROUP | Age: 78
End: 2023-11-25
Payer: MEDICARE

## 2023-11-25 ENCOUNTER — HOSPITAL ENCOUNTER (OUTPATIENT)
Facility: MEDICAL CENTER | Age: 78
End: 2023-11-25
Attending: PHYSICIAN ASSISTANT
Payer: MEDICARE

## 2023-11-25 VITALS
OXYGEN SATURATION: 98 % | BODY MASS INDEX: 26.31 KG/M2 | WEIGHT: 143 LBS | RESPIRATION RATE: 16 BRPM | DIASTOLIC BLOOD PRESSURE: 74 MMHG | SYSTOLIC BLOOD PRESSURE: 116 MMHG | HEIGHT: 62 IN | TEMPERATURE: 96.9 F | HEART RATE: 65 BPM

## 2023-11-25 DIAGNOSIS — N30.01 ACUTE CYSTITIS WITH HEMATURIA: ICD-10-CM

## 2023-11-25 LAB
APPEARANCE UR: NORMAL
BILIRUB UR STRIP-MCNC: NEGATIVE MG/DL
COLOR UR AUTO: YELLOW
GLUCOSE UR STRIP.AUTO-MCNC: NEGATIVE MG/DL
KETONES UR STRIP.AUTO-MCNC: NEGATIVE MG/DL
LEUKOCYTE ESTERASE UR QL STRIP.AUTO: NORMAL
NITRITE UR QL STRIP.AUTO: NEGATIVE
PH UR STRIP.AUTO: 5.5 [PH] (ref 5–8)
PROT UR QL STRIP: NEGATIVE MG/DL
RBC UR QL AUTO: NORMAL
SP GR UR STRIP.AUTO: <=1.005
UROBILINOGEN UR STRIP-MCNC: 0.2 MG/DL

## 2023-11-25 PROCEDURE — 99213 OFFICE O/P EST LOW 20 MIN: CPT | Performed by: PHYSICIAN ASSISTANT

## 2023-11-25 PROCEDURE — 87077 CULTURE AEROBIC IDENTIFY: CPT

## 2023-11-25 PROCEDURE — 87086 URINE CULTURE/COLONY COUNT: CPT

## 2023-11-25 PROCEDURE — 3078F DIAST BP <80 MM HG: CPT | Performed by: PHYSICIAN ASSISTANT

## 2023-11-25 PROCEDURE — 81002 URINALYSIS NONAUTO W/O SCOPE: CPT | Performed by: PHYSICIAN ASSISTANT

## 2023-11-25 PROCEDURE — 87186 SC STD MICRODIL/AGAR DIL: CPT

## 2023-11-25 PROCEDURE — 3074F SYST BP LT 130 MM HG: CPT | Performed by: PHYSICIAN ASSISTANT

## 2023-11-25 RX ORDER — CEFDINIR 300 MG/1
300 CAPSULE ORAL DAILY
Qty: 7 CAPSULE | Refills: 0 | Status: SHIPPED | OUTPATIENT
Start: 2023-11-25 | End: 2023-12-02

## 2023-11-25 ASSESSMENT — ENCOUNTER SYMPTOMS
ABDOMINAL PAIN: 0
FEVER: 0
COUGH: 0
CONSTIPATION: 0
MYALGIAS: 0
EYE PAIN: 0
VOMITING: 0
CHILLS: 0
DIARRHEA: 0
HEADACHES: 0
NAUSEA: 0
SHORTNESS OF BREATH: 0
SORE THROAT: 0

## 2023-11-25 ASSESSMENT — FIBROSIS 4 INDEX: FIB4 SCORE: 1.96

## 2023-11-25 NOTE — PROGRESS NOTES
"Subjective:   Zo Sanders is a 78 y.o. female who presents for UTI (X1 day/burning)      Is a pleasant 78-year-old female who began noting urinary symptoms yesterday with dysuria, frequency and urgency.  Has not noted any fevers or chills or flank pain.  Feels like previous urinary tract infection, last UTI was earlier this year although she did have a routine cystoscopy performed around 7 to 10 days ago that she suspects might be the cause of her symptoms.    Review of Systems   Constitutional:  Negative for chills and fever.   HENT:  Negative for congestion, ear pain and sore throat.    Eyes:  Negative for pain.   Respiratory:  Negative for cough and shortness of breath.    Cardiovascular:  Negative for chest pain.   Gastrointestinal:  Negative for abdominal pain, constipation, diarrhea, nausea and vomiting.   Genitourinary:  Positive for dysuria, frequency and urgency.   Musculoskeletal:  Negative for myalgias.   Skin:  Negative for rash.   Neurological:  Negative for headaches.       Medications, Allergies, and current problem list reviewed today in Epic.     Objective:     /74   Pulse 65   Temp 36.1 °C (96.9 °F) (Temporal)   Resp 16   Ht 1.575 m (5' 2\")   Wt 64.9 kg (143 lb)   SpO2 98%     Physical Exam  Vitals reviewed.   Constitutional:       Appearance: Normal appearance.   HENT:      Head: Normocephalic and atraumatic.      Right Ear: External ear normal.      Left Ear: External ear normal.      Nose: Nose normal.      Mouth/Throat:      Mouth: Mucous membranes are moist.   Eyes:      Conjunctiva/sclera: Conjunctivae normal.   Cardiovascular:      Rate and Rhythm: Normal rate.   Pulmonary:      Effort: Pulmonary effort is normal.   Abdominal:      Tenderness: There is no right CVA tenderness or left CVA tenderness.   Skin:     General: Skin is warm and dry.      Capillary Refill: Capillary refill takes less than 2 seconds.   Neurological:      Mental Status: She is alert and oriented " to person, place, and time.         Assessment/Plan:     Diagnosis and associated orders:     1. Acute cystitis with hematuria  cefdinir (OMNICEF) 300 MG Cap    POCT Urinalysis    URINE CULTURE(NEW)         Comments/MDM:     History and physical as well as urinalysis supports lower urinary tract infection.  No evidence of pyelonephritis.  Patient with decreased kidney function, will put on renal dose cefdinir and perform urine culture.         Differential diagnosis, natural history, supportive care, and indications for immediate follow-up discussed.    Advised the patient to follow-up with the primary care physician for recheck, reevaluation, and consideration of further management.    Please note that this dictation was created using voice recognition software. I have made a reasonable attempt to correct obvious errors, but I expect that there are errors of grammar and possibly content that I did not discover before finalizing the note.    This note was electronically signed by Jordan Lyles PA-C

## 2023-11-29 ENCOUNTER — PATIENT MESSAGE (OUTPATIENT)
Dept: HEALTH INFORMATION MANAGEMENT | Facility: OTHER | Age: 78
End: 2023-11-29

## 2024-02-14 ENCOUNTER — TELEPHONE (OUTPATIENT)
Dept: CARDIOLOGY | Facility: MEDICAL CENTER | Age: 79
End: 2024-02-14
Payer: MEDICARE

## 2024-06-28 ENCOUNTER — HOSPITAL ENCOUNTER (OUTPATIENT)
Facility: MEDICAL CENTER | Age: 79
End: 2024-06-28
Payer: MEDICARE

## 2024-06-28 PROCEDURE — 87077 CULTURE AEROBIC IDENTIFY: CPT

## 2024-06-28 PROCEDURE — 87086 URINE CULTURE/COLONY COUNT: CPT

## 2024-06-30 LAB
BACTERIA UR CULT: NORMAL
SIGNIFICANT IND 70042: NORMAL
SITE SITE: NORMAL
SOURCE SOURCE: NORMAL

## 2024-07-11 ENCOUNTER — HOSPITAL ENCOUNTER (OUTPATIENT)
Facility: MEDICAL CENTER | Age: 79
End: 2024-07-11
Payer: MEDICARE

## 2024-07-11 PROCEDURE — 87186 SC STD MICRODIL/AGAR DIL: CPT

## 2024-07-11 PROCEDURE — 87086 URINE CULTURE/COLONY COUNT: CPT

## 2024-07-11 PROCEDURE — 87077 CULTURE AEROBIC IDENTIFY: CPT

## 2024-07-31 ENCOUNTER — HOSPITAL ENCOUNTER (OUTPATIENT)
Facility: MEDICAL CENTER | Age: 79
End: 2024-07-31
Attending: PHYSICIAN ASSISTANT
Payer: MEDICARE

## 2024-07-31 PROCEDURE — 87186 SC STD MICRODIL/AGAR DIL: CPT

## 2024-07-31 PROCEDURE — 87077 CULTURE AEROBIC IDENTIFY: CPT

## 2024-07-31 PROCEDURE — 87086 URINE CULTURE/COLONY COUNT: CPT

## 2024-09-26 ENCOUNTER — OFFICE VISIT (OUTPATIENT)
Dept: URGENT CARE | Facility: PHYSICIAN GROUP | Age: 79
End: 2024-09-26
Payer: MEDICARE

## 2024-09-26 ENCOUNTER — HOSPITAL ENCOUNTER (OUTPATIENT)
Facility: MEDICAL CENTER | Age: 79
End: 2024-09-26
Attending: NURSE PRACTITIONER
Payer: MEDICARE

## 2024-09-26 VITALS
DIASTOLIC BLOOD PRESSURE: 66 MMHG | BODY MASS INDEX: 26.31 KG/M2 | OXYGEN SATURATION: 98 % | WEIGHT: 143 LBS | SYSTOLIC BLOOD PRESSURE: 118 MMHG | HEART RATE: 72 BPM | HEIGHT: 62 IN | TEMPERATURE: 97.4 F | RESPIRATION RATE: 16 BRPM

## 2024-09-26 DIAGNOSIS — R39.9 UTI SYMPTOMS: ICD-10-CM

## 2024-09-26 LAB
APPEARANCE UR: CLEAR
BILIRUB UR STRIP-MCNC: NEGATIVE MG/DL
COLOR UR AUTO: YELLOW
GLUCOSE UR STRIP.AUTO-MCNC: NEGATIVE MG/DL
KETONES UR STRIP.AUTO-MCNC: NEGATIVE MG/DL
LEUKOCYTE ESTERASE UR QL STRIP.AUTO: NORMAL
NITRITE UR QL STRIP.AUTO: NEGATIVE
PH UR STRIP.AUTO: 5.5 [PH] (ref 5–8)
PROT UR QL STRIP: NEGATIVE MG/DL
RBC UR QL AUTO: NEGATIVE
SP GR UR STRIP.AUTO: 1
UROBILINOGEN UR STRIP-MCNC: 0.2 MG/DL

## 2024-09-26 PROCEDURE — 3078F DIAST BP <80 MM HG: CPT | Performed by: NURSE PRACTITIONER

## 2024-09-26 PROCEDURE — 99213 OFFICE O/P EST LOW 20 MIN: CPT | Performed by: NURSE PRACTITIONER

## 2024-09-26 PROCEDURE — 3074F SYST BP LT 130 MM HG: CPT | Performed by: NURSE PRACTITIONER

## 2024-09-26 PROCEDURE — 81002 URINALYSIS NONAUTO W/O SCOPE: CPT | Performed by: NURSE PRACTITIONER

## 2024-09-26 PROCEDURE — 87086 URINE CULTURE/COLONY COUNT: CPT

## 2024-09-26 RX ORDER — CEFUROXIME AXETIL 500 MG/1
TABLET ORAL
COMMUNITY
End: 2024-09-26

## 2024-09-26 RX ORDER — CEPHALEXIN 500 MG/1
500 CAPSULE ORAL 4 TIMES DAILY
Qty: 28 CAPSULE | Refills: 0 | Status: SHIPPED | OUTPATIENT
Start: 2024-09-26 | End: 2024-10-03

## 2024-09-26 RX ORDER — NITROFURANTOIN 25; 75 MG/1; MG/1
CAPSULE ORAL
COMMUNITY
End: 2024-09-26

## 2024-09-26 ASSESSMENT — ENCOUNTER SYMPTOMS
NAUSEA: 0
BACK PAIN: 0
VOMITING: 0
CHILLS: 0
ABDOMINAL PAIN: 0
FEVER: 0

## 2024-09-26 ASSESSMENT — FIBROSIS 4 INDEX: FIB4 SCORE: 1.99

## 2024-09-26 NOTE — PROGRESS NOTES
"Subjective     Zo Sanders is a 79 y.o. female who presents with UTI (Patient coming in for urinary infection, feeling discomfort, burning feeling x 1 day )            Pt developed urinary frequency and dysuria yesterday, worsening this am. She has a hx of 1 kidney and frequent UTIs and does have a urologist that she sees, but not w/in the last year. She denies fever, N/V, or urgency.     UTI  Pertinent negatives include no abdominal pain, chills, fever, nausea or vomiting.       Review of Systems   Constitutional:  Negative for chills, fever and malaise/fatigue.   Gastrointestinal:  Negative for abdominal pain, nausea and vomiting.   Genitourinary:  Positive for dysuria and frequency. Negative for urgency.   Musculoskeletal:  Negative for back pain.              Objective     /66 (BP Location: Right arm, Patient Position: Sitting, BP Cuff Size: Adult long)   Pulse 72   Temp 36.3 °C (97.4 °F) (Temporal)   Resp 16   Ht 1.575 m (5' 2\")   Wt 64.9 kg (143 lb)   SpO2 98%   BMI 26.16 kg/m²      Physical Exam  Constitutional:       General: She is not in acute distress.     Appearance: Normal appearance. She is not ill-appearing.   Cardiovascular:      Rate and Rhythm: Normal rate and regular rhythm.      Heart sounds: Normal heart sounds.   Pulmonary:      Effort: Pulmonary effort is normal.   Skin:     General: Skin is warm and dry.      Capillary Refill: Capillary refill takes less than 2 seconds.   Neurological:      Mental Status: She is alert.               Results for orders placed or performed in visit on 09/26/24   POCT Urinalysis   Result Value Ref Range    POC Color yellow Negative    POC Appearance clear Negative    POC Glucose negative Negative mg/dL    POC Bilirubin negative Negative mg/dL    POC Ketones negative Negative mg/dL    POC Specific Gravity 1.005 <1.005 - >1.030    POC Blood negative Negative    POC Urine PH 5.5 5.0 - 8.0    POC Protein negative Negative mg/dL    POC " Urobiligen 0.2 Negative (0.2) mg/dL    POC Nitrites negative Negative    POC Leukocyte Esterase moderate Negative                Assessment & Plan        Assessment & Plan     1. UTI Symptoms    Based on the patient's symptoms and length of time, differential diagnoses of UTI and pyelonephritis are considered. A POCT UA was done in the clinic and urine culture will be sent. Based on the patient's hx of frequent UTIs and length of symptoms, a course of abx will be prescribed. Discussed with patient the length of time for culture results to come back and that if there is a need for change in abx, we will reach out to her. Discussed hygiene practices for prevention. Also reviewed red flag symptoms that would necessitate her to return to the clinic or present to ED.  Pt agreeable with plan.      RX:   cephALEXin (KEFLEX) 500MG cap. Take 1 Capsule by mouth 4 times a day for 7 days.     I was present with a nurse practitioner student who participated in the documentation of this note. I personally saw and evaluated the patient and performed my own history and examination. I discussed the case with the nurse practitioner student. I have reviewed, verified, and revised the note as necessary and agree with the content and plan as written by the nurse practitioner student.    GLADIS Foster

## 2024-09-27 DIAGNOSIS — R39.9 UTI SYMPTOMS: ICD-10-CM

## 2024-09-28 ENCOUNTER — HOSPITAL ENCOUNTER (OUTPATIENT)
Dept: RADIOLOGY | Facility: MEDICAL CENTER | Age: 79
End: 2024-09-28
Attending: UROLOGY
Payer: MEDICARE

## 2024-09-28 LAB
BACTERIA UR CULT: NORMAL
SIGNIFICANT IND 70042: NORMAL
SITE SITE: NORMAL
SOURCE SOURCE: NORMAL

## 2024-09-30 ENCOUNTER — HOSPITAL ENCOUNTER (OUTPATIENT)
Dept: RADIOLOGY | Facility: MEDICAL CENTER | Age: 79
End: 2024-09-30
Attending: UROLOGY
Payer: MEDICARE

## 2024-09-30 VITALS — HEIGHT: 62 IN | BODY MASS INDEX: 27.71 KG/M2 | WEIGHT: 150.57 LBS

## 2024-09-30 DIAGNOSIS — C66.9 MALIGNANT NEOPLASM OF URETER, UNSPECIFIED LATERALITY (HCC): ICD-10-CM

## 2024-09-30 PROCEDURE — 700117 HCHG RX CONTRAST REV CODE 255: Mod: JZ | Performed by: UROLOGY

## 2024-09-30 PROCEDURE — A9579 GAD-BASE MR CONTRAST NOS,1ML: HCPCS | Mod: JZ | Performed by: UROLOGY

## 2024-09-30 PROCEDURE — 700111 HCHG RX REV CODE 636 W/ 250 OVERRIDE (IP): Performed by: RADIOLOGY

## 2024-09-30 PROCEDURE — 700105 HCHG RX REV CODE 258: Performed by: RADIOLOGY

## 2024-09-30 PROCEDURE — 72197 MRI PELVIS W/O & W/DYE: CPT

## 2024-09-30 PROCEDURE — 74183 MRI ABD W/O CNTR FLWD CNTR: CPT

## 2024-09-30 RX ORDER — SODIUM CHLORIDE 9 MG/ML
683 INJECTION, SOLUTION INTRAVENOUS ONCE
Status: COMPLETED | OUTPATIENT
Start: 2024-09-30 | End: 2024-09-30

## 2024-09-30 RX ORDER — FUROSEMIDE 10 MG/ML
20 INJECTION INTRAMUSCULAR; INTRAVENOUS
Status: COMPLETED | OUTPATIENT
Start: 2024-09-30 | End: 2024-09-30

## 2024-09-30 RX ADMIN — FUROSEMIDE 20 MG: 10 INJECTION INTRAMUSCULAR; INTRAVENOUS at 16:30

## 2024-09-30 RX ADMIN — SODIUM CHLORIDE 683 ML: 9 INJECTION, SOLUTION INTRAVENOUS at 14:55

## 2024-09-30 RX ADMIN — GADOTERIDOL 15 ML: 279.3 INJECTION, SOLUTION INTRAVENOUS at 17:46

## 2024-09-30 ASSESSMENT — FIBROSIS 4 INDEX: FIB4 SCORE: 1.99

## 2024-11-01 DIAGNOSIS — I10 ESSENTIAL HYPERTENSION: ICD-10-CM

## 2024-11-01 DIAGNOSIS — I47.10 SVT (SUPRAVENTRICULAR TACHYCARDIA) (HCC): ICD-10-CM

## 2024-11-01 RX ORDER — LISINOPRIL 20 MG/1
20 TABLET ORAL DAILY
Qty: 90 TABLET | Refills: 3 | Status: SHIPPED | OUTPATIENT
Start: 2024-11-01

## 2024-11-01 RX ORDER — AMLODIPINE BESYLATE 10 MG/1
10 TABLET ORAL DAILY
Qty: 90 TABLET | Refills: 0 | Status: SHIPPED | OUTPATIENT
Start: 2024-11-01

## 2024-11-01 NOTE — TELEPHONE ENCOUNTER
Is the patient due for a refill? Yes    Was the patient seen the past year? No    Date of last office visit: 09/06/2023    Does the patient have an upcoming appointment?  No     Provider to refill: CW     Does the patient have custodial Plus and need 100-day supply? (This applies to ALL medications) Patient does not have SCP

## 2024-12-03 ENCOUNTER — HOSPITAL ENCOUNTER (OUTPATIENT)
Dept: RADIOLOGY | Facility: MEDICAL CENTER | Age: 79
End: 2024-12-03
Attending: FAMILY MEDICINE
Payer: MEDICARE

## 2024-12-03 DIAGNOSIS — Z12.31 VISIT FOR SCREENING MAMMOGRAM: ICD-10-CM

## 2024-12-03 PROCEDURE — 77067 SCR MAMMO BI INCL CAD: CPT

## 2024-12-19 ENCOUNTER — HOSPITAL ENCOUNTER (OUTPATIENT)
Dept: RADIOLOGY | Facility: MEDICAL CENTER | Age: 79
End: 2024-12-19
Attending: FAMILY MEDICINE
Payer: MEDICARE

## 2024-12-19 DIAGNOSIS — R92.8 ABNORMAL MAMMOGRAM: ICD-10-CM

## 2024-12-19 PROCEDURE — G0279 TOMOSYNTHESIS, MAMMO: HCPCS

## 2025-01-16 NOTE — ANESTHESIA POSTPROCEDURE EVALUATION
Patient: Zo Mayo Oppio    Procedure Summary     Date: 03/30/21 Room / Location: [place] [address] / SURGERY [facility]    Anesthesia Start: 0735 Anesthesia Stop: 1132    Procedures:       NEPHROURETERECTOMY, ROBOT-ASSISTED, USING DA GERALD XI (Left Flank)      LYMPHADENECTOMY, ROBOT-ASSISTED, USING DA GERALD XI - PELVIC AND INSTILLATION OF INTRAVESICAL CHEMOTHERAPY(GEMCITABINE) (Left Flank) Diagnosis: (TRANSITIONAL CELL CARCINOMA OF KIDNEY)    Surgeons: Mango Gardner M.D. Responsible Provider: Joshua Scott M.D.    Anesthesia Type: general, peripheral nerve block ASA Status: 2          Final Anesthesia Type: general, peripheral nerve block  Last vitals  BP   Blood Pressure : 112/50, NIBP: 122/80    Temp   36 °C (96.8 °F)    Pulse   80   Resp   14    SpO2   100 %      Anesthesia Post Evaluation    Patient location during evaluation: PACU  Patient participation: complete - patient participated  Level of consciousness: awake and confused  Pain score: 4    Airway patency: patent  Anesthetic complications: no  Cardiovascular status: hemodynamically stable  Respiratory status: acceptable  Hydration status: euvolemic  Comments: Mildly confused. Complains of needing to urinate.    PONV: none          No complications documented.     Nurse Pain Score: 4 (NPRS)         [employer] letter written for patient to receive assistance with Xarelto due to financial hardship

## 2025-01-30 DIAGNOSIS — I10 ESSENTIAL HYPERTENSION: ICD-10-CM

## 2025-01-30 RX ORDER — AMLODIPINE BESYLATE 10 MG/1
10 TABLET ORAL DAILY
Qty: 90 TABLET | Refills: 0 | Status: SHIPPED | OUTPATIENT
Start: 2025-01-30

## 2025-05-03 DIAGNOSIS — I10 ESSENTIAL HYPERTENSION: ICD-10-CM

## 2025-05-03 NOTE — LETTER
May 5, 2025        Zo Ortegapio  [address]  [place] [address]        Dear Zo Cisneros:    Our records show that your last office visit was 9/6/2023 with Dr. Palma. We provided a courtesy refill for your medication, Amlodipine, in January 2025. We are unable to provide further refills until you are seen in the office.    Please call [phone] to schedule an appointment.    If you have any questions or concerns, please don't hesitate to call.        Sincerely,        Lynn FRANK RN  Cardiology    Electronically Signed

## 2025-05-05 RX ORDER — AMLODIPINE BESYLATE 10 MG/1
10 TABLET ORAL
Qty: 90 TABLET | Refills: 0 | OUTPATIENT
Start: 2025-05-05

## 2025-05-05 NOTE — TELEPHONE ENCOUNTER
Is the patient due for a refill? Yes    Was the patient seen the last 15 months? No    Date of last office visit: 9/6/23    Does the patient have an upcoming appointment?  No   If yes, When?     Provider to refill:CW    Does the patient have [employer] Plus and need 100-day supply? (This applies to ALL medications) Patient does not have SCP

## 2025-05-05 NOTE — TELEPHONE ENCOUNTER
[employer] message and letter mailed to pt requesting OV. LV 9/6/2023   Advancement Flap (Double) Text: The defect edges were debeveled with a #15 scalpel blade.  Given the location of the defect and the proximity to free margins a double advancement flap was deemed most appropriate.  Using a sterile surgical marker, the appropriate advancement flaps were drawn incorporating the defect and placing the expected incisions within the relaxed skin tension lines where possible.    The area thus outlined was incised deep to adipose tissue with a #15 scalpel blade.  The skin margins were undermined to an appropriate distance in all directions utilizing iris scissors.

## 2025-05-07 DIAGNOSIS — I10 ESSENTIAL HYPERTENSION: ICD-10-CM

## 2025-05-08 ENCOUNTER — OFFICE VISIT (OUTPATIENT)
Dept: CARDIOLOGY | Facility: MEDICAL CENTER | Age: 80
End: 2025-05-08
Payer: MEDICARE

## 2025-05-08 VITALS
WEIGHT: 152 LBS | DIASTOLIC BLOOD PRESSURE: 50 MMHG | RESPIRATION RATE: 16 BRPM | HEIGHT: 62 IN | BODY MASS INDEX: 27.97 KG/M2 | SYSTOLIC BLOOD PRESSURE: 110 MMHG | HEART RATE: 84 BPM | OXYGEN SATURATION: 96 %

## 2025-05-08 DIAGNOSIS — I49.3 PVC (PREMATURE VENTRICULAR CONTRACTION): ICD-10-CM

## 2025-05-08 DIAGNOSIS — I47.10 SVT (SUPRAVENTRICULAR TACHYCARDIA) (HCC): ICD-10-CM

## 2025-05-08 DIAGNOSIS — I49.1 PAC (PREMATURE ATRIAL CONTRACTION): ICD-10-CM

## 2025-05-08 DIAGNOSIS — I10 ESSENTIAL HYPERTENSION: ICD-10-CM

## 2025-05-08 DIAGNOSIS — I35.1 MILD AORTIC REGURGITATION: ICD-10-CM

## 2025-05-08 PROCEDURE — 99212 OFFICE O/P EST SF 10 MIN: CPT

## 2025-05-08 PROCEDURE — 3078F DIAST BP <80 MM HG: CPT

## 2025-05-08 PROCEDURE — 99214 OFFICE O/P EST MOD 30 MIN: CPT

## 2025-05-08 PROCEDURE — 3074F SYST BP LT 130 MM HG: CPT

## 2025-05-08 RX ORDER — AMLODIPINE BESYLATE 10 MG/1
1 TABLET ORAL
COMMUNITY
End: 2025-05-08

## 2025-05-08 RX ORDER — LISINOPRIL 10 MG/1
10 TABLET ORAL DAILY
Qty: 100 TABLET | Refills: 3 | Status: SHIPPED | OUTPATIENT
Start: 2025-05-08

## 2025-05-08 RX ORDER — AMLODIPINE BESYLATE 10 MG/1
10 TABLET ORAL DAILY
Qty: 90 TABLET | Refills: 3 | Status: SHIPPED | OUTPATIENT
Start: 2025-05-08

## 2025-05-08 ASSESSMENT — ENCOUNTER SYMPTOMS
DYSPNEA ON EXERTION: 0
NEAR-SYNCOPE: 0
PND: 0
DIZZINESS: 0
PALPITATIONS: 0
SHORTNESS OF BREATH: 0
ORTHOPNEA: 0
HEADACHES: 0
LIGHT-HEADEDNESS: 0
SYNCOPE: 0

## 2025-05-08 ASSESSMENT — FIBROSIS 4 INDEX: FIB4 SCORE: 1.99

## 2025-05-08 NOTE — PROGRESS NOTES
"Chief Complaint   Patient presents with    Supraventricular Tachycardia (SVT)     Follow up           Subjective:   Zo Sanders is a 79 y.o. female who presents today for follow-up.     Patient of Dr. Palma.  Current medical problems include PVC, PAC, HTN. Their last clinic visit was 2023 with Dr. Palma.    Today's visit:  Reports she been feeling overall well from a cardiac perspective.  Patient reports she saw her primary care provider few months ago and did report some occasional dizziness upon moving from sitting to standing.  Her blood pressure had been lower in office at that appointment and his lisinopril was cut back to 10 mg daily.  Patient has not felt a significant difference in her dizziness with the medication change.  Patient is not currently taking blood pressure at home.  Patient denies chest pain, shortness of breath, orthopnea, PND, edema, or syncope.  She goes on walks and walks her granddaughter to school daily without any limitations or exertional symptoms.     Cardiovascular Risk Factors:  1. Smoking status: Former smoker  2. Type II Diabetes Mellitus: No No results found for: \"HBA1C\"  3. Hypertension: Yes  4. Dyslipidemia: No   Cholesterol,Tot   Date Value Ref Range Status   2023 179 100 - 199 mg/dL Final     LDL   Date Value Ref Range Status   2023 98 <100 mg/dL Final     HDL   Date Value Ref Range Status   2023 54 >=40 mg/dL Final     Triglycerides   Date Value Ref Range Status   2023 136 0 - 149 mg/dL Final       Past Medical History:   Diagnosis Date    Arrhythmia     \"too fast\"    Cancer (HCC) 10/2020    uretral    Cataract 2021    no surgery yet    Hypertension          Family History   Problem Relation Age of Onset    Heart Disease Mother          Social History     Tobacco Use    Smoking status: Former     Current packs/day: 0.00     Types: Cigarettes     Quit date: 1971     Years since quittin.8    Smokeless tobacco: Never " "  Vaping Use    Vaping status: Never Used   Substance Use Topics    Alcohol use: No    Drug use: No         No Known Allergies      Current Outpatient Medications   Medication Sig    Multiple Vitamins-Minerals (ICAPS AREDS 2 PO) Take  by mouth.    D-MANNOSE PO Take  by mouth.    lisinopril (PRINIVIL) 10 MG Tab Take 1 Tablet by mouth every day.    amLODIPine (NORVASC) 10 MG Tab Take 1 Tablet by mouth every day.    diphenhydrAMINE-APAP, sleep, (TYLENOL PM EXTRA STRENGTH)  MG Tab Take 1 tablet by mouth every bedtime.    acetaminophen (TYLENOL) 500 MG Tab Take 500 mg by mouth every 6 hours as needed.    triamcinolone acetonide (KENALOG) 0.1 % Cream Apply 1 Application topically as needed.         Review of Systems   Constitutional: Negative for malaise/fatigue.   Cardiovascular:  Negative for chest pain, dyspnea on exertion, leg swelling, near-syncope, orthopnea, palpitations, paroxysmal nocturnal dyspnea and syncope.   Respiratory:  Negative for shortness of breath.    Neurological:  Negative for dizziness, headaches and light-headedness.           Objective:   /50 (BP Location: Left arm, Patient Position: Sitting)   Pulse 84   Resp 16   Ht 1.575 m (5' 2\")   Wt 68.9 kg (152 lb)   SpO2 96%  Body mass index is 27.8 kg/m².         Physical Exam  Vitals reviewed.   Constitutional:       General: She is not in acute distress.     Appearance: Normal appearance.   HENT:      Head: Normocephalic and atraumatic.   Cardiovascular:      Rate and Rhythm: Normal rate and regular rhythm.      Pulses: Normal pulses.      Heart sounds: Murmur heard.   Pulmonary:      Effort: Pulmonary effort is normal. No respiratory distress.      Breath sounds: Normal breath sounds.   Musculoskeletal:      Right lower leg: No edema.      Left lower leg: No edema.   Neurological:      Mental Status: She is alert and oriented to person, place, and time.      Gait: Gait normal.   Psychiatric:         Behavior: Behavior normal.      " "       Lab Results   Component Value Date/Time    SODIUM 139 09/26/2023 10:12 AM    POTASSIUM 5.0 09/26/2023 10:12 AM    CHLORIDE 106 09/26/2023 10:12 AM    CO2 22 09/26/2023 10:12 AM    GLUCOSE 91 09/26/2023 10:12 AM    BUN 18 09/26/2023 10:12 AM    CREATININE 1.37 09/26/2023 10:12 AM    BUNCREATRAT 14 02/08/2018 11:53 AM      Lab Results   Component Value Date/Time    WBC 3.4 (L) 09/26/2023 10:12 AM    RBC 4.43 09/26/2023 10:12 AM    HEMOGLOBIN 13.6 09/26/2023 10:12 AM    HEMATOCRIT 43.4 09/26/2023 10:12 AM    MCV 98.0 (H) 09/26/2023 10:12 AM    MCH 30.7 09/26/2023 10:12 AM    MCHC 31.3 (L) 09/26/2023 10:12 AM    MPV 10.2 09/26/2023 10:12 AM    NEUTSPOLYS 67.50 09/26/2023 10:12 AM    LYMPHOCYTES 18.00 (L) 09/26/2023 10:12 AM    MONOCYTES 6.50 09/26/2023 10:12 AM    EOSINOPHILS 7.40 (H) 09/26/2023 10:12 AM    BASOPHILS 0.60 09/26/2023 10:12 AM      Lab Results   Component Value Date/Time    CHOLSTRLTOT 179 09/26/2023 10:12 AM    LDL 98 09/26/2023 10:12 AM    HDL 54 09/26/2023 10:12 AM    TRIGLYCERIDE 136 09/26/2023 10:12 AM       No results found for: \"TROPONINT\"  No results found for: \"NTPROBNP\"  Assessment:   1. Essential hypertension  - lisinopril (PRINIVIL) 10 MG Tab; Take 1 Tablet by mouth every day.  Dispense: 100 Tablet; Refill: 3  - amLODIPine (NORVASC) 10 MG Tab; Take 1 Tablet by mouth every day.  Dispense: 90 Tablet; Refill: 3    2. SVT (supraventricular tachycardia) (HCC)  - lisinopril (PRINIVIL) 10 MG Tab; Take 1 Tablet by mouth every day.  Dispense: 100 Tablet; Refill: 3    3. Mild aortic regurgitation    4. PAC (premature atrial contraction)    5. PVC (premature ventricular contraction)    Other orders  - Multiple Vitamins-Minerals (ICAPS AREDS 2 PO); Take  by mouth.  - D-MANNOSE PO; Take  by mouth.        Medical Decision Making:  Today's Assessment / Plan:   Hypertension  - Good control, possibly over controlled and patient reporting dizziness with standing which resolves quickly  - continue " amlodipine 10 mg daily  -continue lisinopril 10 mg daily  - goal < 130/80  -start monitoring blood pressure at home and keep a log, [facility] and adjust medication if having low blood pressures at home.    SVT  PVC  PAC  -Stable, reports occasional quick episodes of SVT that are not sustained    Mild aortic regurgitation  -Denies any progression of symptoms. Surveillance echocardiogram as needed.    Discussed can schedule follow up next year with Dr. Palma but since she has not seen him since 2023 patient wants to schedule follow up in 6 months when her  also sees Dr. Palma.    Return in about 1 year (around 5/8/2026) for Dr. Palma.  Sooner if problems.    POOL [place].

## 2025-05-22 VITALS — DIASTOLIC BLOOD PRESSURE: 71 MMHG | SYSTOLIC BLOOD PRESSURE: 117 MMHG

## (undated) DEVICE — BAG SPONGE COUNT 10.25 X 32 - BLUE (250/CA)

## (undated) DEVICE — BASKET ZERO 1.9FR X 120CM

## (undated) DEVICE — BAG RETRIEVAL 12/15 MM INZII (5EA/CA) THIS WILL REPLACE ITEM 75018

## (undated) DEVICE — SET TUBING PNEUMOCLEAR HIGH FLOW SMOKE EVACUATION (10EA/BX)

## (undated) DEVICE — SET LEADWIRE 5 LEAD BEDSIDE DISPOSABLE ECG (1SET OF 5/EA)

## (undated) DEVICE — NEEDLE INSFL 120MM 14GA VRRS - (20/BX)

## (undated) DEVICE — BAG URODRAIN WITH TUBING - (20/CA)

## (undated) DEVICE — ROBOTIC SURGERY SERVICES

## (undated) DEVICE — FORCEPS FENESTRATED BIPOLAR DA VINCI 10X'S REUSABLE

## (undated) DEVICE — SET IRRIGATION CYSTOSCOPY Y-TYPE L81 IN (20EA/CA)

## (undated) DEVICE — HUMID-VENT HEAT AND MOISTURE EXCHANGE- (50/BX)

## (undated) DEVICE — GLOVE, LITE (PAIR)

## (undated) DEVICE — DRESSING NON ADHERENT 3 X 4 - STERILE (100/BX 12BX/CA)

## (undated) DEVICE — GOWN SURGEONS X-LARGE - DISP. (30/CA)

## (undated) DEVICE — GLOVE BIOGEL INDICATOR SZ 8 SURGICAL PF LTX - (50/BX 4BX/CA)

## (undated) DEVICE — DRAIN JACKSON PRATT 15FR - (10EA/CA)

## (undated) DEVICE — OBTURATOR BLADELESS STANDARD 8MM (6EA/BX)

## (undated) DEVICE — WIRE GUIDE SENSOR DUAL FLEX - 5/BX

## (undated) DEVICE — ELECTRODE 850 FOAM ADHESIVE - HYDROGEL RADIOTRNSPRNT (50/PK)

## (undated) DEVICE — GLOVE BIOGEL PI ORTHO SZ 6 1/2 SURGICAL PF LF (40PR/BX)

## (undated) DEVICE — SCISSORS 5MM CVD (6EA/BX)

## (undated) DEVICE — GOWN WARMING STANDARD FLEX - (30/CA)

## (undated) DEVICE — TUBING CLEARLINK DUO-VENT - C-FLO (48EA/CA)

## (undated) DEVICE — SET SUCTION/IRRIGATION WITH DISPOSABLE TIP (6/CA )PART #0250-070-520 IS A SUB

## (undated) DEVICE — APPLIER 5MM MED/LARGE CLIP - (3/BX)

## (undated) DEVICE — SUCTION INSTRUMENT YANKAUER BULBOUS TIP W/O VENT (50EA/CA)

## (undated) DEVICE — FORCEPS PROGRASP DA VINCI 10X'S REUSABLE

## (undated) DEVICE — PROTECTOR ULNA NERVE - (36PR/CA)

## (undated) DEVICE — ELECTRODE DUAL RETURN W/ CORD - (50/PK)

## (undated) DEVICE — SEAL 5MM-8MM UNIVERSAL  BOX OF 10

## (undated) DEVICE — SPONGE GAUZESTER 4 X 4 4PLY - (128PK/CA)

## (undated) DEVICE — SYRINGE 30 ML LS (56/BX)

## (undated) DEVICE — CLIP HEM-O-LOC GREEN - (14EA/BX)

## (undated) DEVICE — HEAD HOLDER JUNIOR/ADULT

## (undated) DEVICE — CANISTER SUCTION RIGID RED 1500CC (40EA/CA)

## (undated) DEVICE — LACTATED RINGERS INJ 1000 ML - (14EA/CA 60CA/PF)

## (undated) DEVICE — SENSOR SPO2 NEO LNCS ADHESIVE (20/BX) SEE USER NOTES

## (undated) DEVICE — DRAPE ARM  BOX OF 20

## (undated) DEVICE — GLOVE BIOGEL SZ 7.5 SURGICAL PF LTX - (50PR/BX 4BX/CA)

## (undated) DEVICE — CHLORAPREP 26 ML APPLICATOR - ORANGE TINT(25/CA)

## (undated) DEVICE — SPONGE DRAIN 4 X 4IN 6-PLY - (2/PK25PK/BX12BX/CS)

## (undated) DEVICE — RESERVOIR SUCTION 100 CC - SILICONE (20EA/CA)

## (undated) DEVICE — NEPTUNE 4 PORT MANIFOLD - (20/PK)

## (undated) DEVICE — GOWN SURGICAL XX-LARGE - (28EA/CA) SIRUS NON REINFORCED

## (undated) DEVICE — PACK CYSTOSCOPY III - (8/CA)

## (undated) DEVICE — TRAY CATHETER FOLEY URINE METER W/STATLOCK 350ML (10EA/CA)

## (undated) DEVICE — STAPLER 45MM ARTICULATING - ENDO (3EA/BX)

## (undated) DEVICE — CATHETER URETHRAL FOLEY SILICONE OD18 FR 10 ML (10EA/CA)

## (undated) DEVICE — SUTURE 2-0 20CM STRATAFIX SPIRAL SH NEEDLE (12/BX)

## (undated) DEVICE — MASK ANESTHESIA ADULT  - (100/CA)

## (undated) DEVICE — BAG DRAINAGE ANTIREFLUX TOWER SLIDE TAP 4000 ML (20EA/CA)

## (undated) DEVICE — JELLY SURGILUBE STERILE FOIL 5 GM (150EA/BX)

## (undated) DEVICE — STAPLE 45MM VASCULAR WHITE 2.5MM (12EA/BX)

## (undated) DEVICE — SUTURE GENERAL

## (undated) DEVICE — TOWELS CLOTH SURGICAL - (4/PK 20PK/CA)

## (undated) DEVICE — DRAPE COLUMN  BOX OF 20

## (undated) DEVICE — WATER IRRIGATION STERILE 1000ML (12EA/CA)

## (undated) DEVICE — SHEARS MONOPOLAR CURVED  DA VINCI 10X'S REUSABLE

## (undated) DEVICE — ELECTRODER MONOPOLAR COAGULATING POINTED YELLOW 24 FR (6EA/PK)

## (undated) DEVICE — SYSTEM CLEARIFY VISUALIZATION (10EA/PK)

## (undated) DEVICE — SLEEVE, VASO, THIGH, MED

## (undated) DEVICE — TROCAR 5X100 NON BLADED Z-TH - READ KII (6/BX)

## (undated) DEVICE — Device

## (undated) DEVICE — KIT ANESTHESIA W/CIRCUIT & 3/LT BAG W/FILTER (20EA/CA)

## (undated) DEVICE — TUBE CONNECT SUCTION CLEAR 120 X 1/4" (50EA/CA)"

## (undated) DEVICE — SUTURE 4-0 MONOCRYL PLUS PS-1 - 27 INCH (36/BX)

## (undated) DEVICE — CLIP HEMOLOCK PURPLE - (14/BX)

## (undated) DEVICE — GLOVE BIOGEL INDICATOR SZ 7SURGICAL PF LTX - (50/BX 4BX/CA)

## (undated) DEVICE — SET EXTENSION WITH 2 PORTS (48EA/CA) ***PART #2C8610 IS A SUBSTITUTE*****

## (undated) DEVICE — COVER TIP ENDOWRIST HOT SHEAR - (10EA/BX) DA VINCI

## (undated) DEVICE — GOWN SURGICAL X-LARGE ULTRA - FILM-REINFORCED (20/CA)

## (undated) DEVICE — BASKET BAGLEY 1.9 - CAN SUB 340-122 IN A PINCH

## (undated) DEVICE — DRAPE LARGE 3 QUARTER - (20/CA)

## (undated) DEVICE — CATHETER URETHRAL OPEN END AXXCESS (10EA/BX)

## (undated) DEVICE — OBTURATOR BLADELESS LONG 8MM (6EA/BX)

## (undated) DEVICE — SUTURE 0 VICRYL PLUS CT-1 - 36 INCH (36/BX)

## (undated) DEVICE — SUTURE 2-0 ETHILON FS - (36/BX) 18 INCH

## (undated) DEVICE — CATHETER URET OPEN END 6FR (10EA/BX)

## (undated) DEVICE — CANNULA W/SEAL12X100ZTHREAD - (12/BX)

## (undated) DEVICE — SODIUM CHL IRRIGATION 0.9% 1000ML (12EA/CA)

## (undated) DEVICE — SUTURE 3-0 VICRYL PLUS SH - 27 INCH (36/BX)

## (undated) DEVICE — BLADE SURGICAL #15 - (50/BX 3BX/CA)

## (undated) DEVICE — BAG RETRIEVAL 10ML (10EA/BX)

## (undated) DEVICE — CANISTER SUCTION 3000ML MECHANICAL FILTER AUTO SHUTOFF MEDI-VAC NONSTERILE LF DISP  (40EA/CA)

## (undated) DEVICE — TROCAR Z THREAD12MM OPTICAL - NON BLADED (6/BX)

## (undated) DEVICE — TUBE CONNECTING SUCTION - CLEAR PLASTIC STERILE 72 IN (50EA/CA)